# Patient Record
Sex: FEMALE | Race: WHITE | Employment: FULL TIME | URBAN - METROPOLITAN AREA
[De-identification: names, ages, dates, MRNs, and addresses within clinical notes are randomized per-mention and may not be internally consistent; named-entity substitution may affect disease eponyms.]

---

## 2019-06-25 ENCOUNTER — OFFICE VISIT (OUTPATIENT)
Dept: ENT CLINIC | Age: 39
End: 2019-06-25
Payer: OTHER GOVERNMENT

## 2019-06-25 ENCOUNTER — PROCEDURE VISIT (OUTPATIENT)
Dept: AUDIOLOGY | Age: 39
End: 2019-06-25
Payer: OTHER GOVERNMENT

## 2019-06-25 VITALS
HEART RATE: 72 BPM | WEIGHT: 198.6 LBS | SYSTOLIC BLOOD PRESSURE: 119 MMHG | HEIGHT: 63 IN | DIASTOLIC BLOOD PRESSURE: 87 MMHG | BODY MASS INDEX: 35.19 KG/M2

## 2019-06-25 DIAGNOSIS — H69.83 ETD (EUSTACHIAN TUBE DYSFUNCTION), BILATERAL: ICD-10-CM

## 2019-06-25 DIAGNOSIS — H90.12 CONDCTV HEAR LOSS, UNI, LEFT EAR, W UNRESTR HEAR CNTRA SIDE: Primary | ICD-10-CM

## 2019-06-25 DIAGNOSIS — H91.93 BILATERAL HEARING LOSS, UNSPECIFIED HEARING LOSS TYPE: Primary | ICD-10-CM

## 2019-06-25 PROCEDURE — 92557 COMPREHENSIVE HEARING TEST: CPT | Performed by: AUDIOLOGIST

## 2019-06-25 PROCEDURE — 92550 TYMPANOMETRY & REFLEX THRESH: CPT | Performed by: AUDIOLOGIST

## 2019-06-25 PROCEDURE — 99203 OFFICE O/P NEW LOW 30 MIN: CPT | Performed by: OTOLARYNGOLOGY

## 2019-06-25 RX ORDER — FLUTICASONE PROPIONATE 50 MCG
SPRAY, SUSPENSION (ML) NASAL
Qty: 1 BOTTLE | Refills: 2 | Status: SHIPPED | OUTPATIENT
Start: 2019-06-25

## 2019-06-25 NOTE — PROGRESS NOTES
Subjective:      Patient ID: Dina Blanca is a 35 y.o. female. who complains of decrease hearing left ear for 6 month duration. Objective:       Assessment:      Mild essentially conductive hearing loss left ear. Right ear responses were within normal limits. Stiff tympanogram left ear.       Plan:      Medical follow-up  Hearing retest as needed  Localization issues discussed    See scan audiogram and tympanogram for additional detail        Link Stroud, AuD

## 2019-07-10 ENCOUNTER — TELEPHONE (OUTPATIENT)
Dept: ENT CLINIC | Age: 39
End: 2019-07-10

## 2019-07-31 ENCOUNTER — OFFICE VISIT (OUTPATIENT)
Dept: ENT CLINIC | Age: 39
End: 2019-07-31
Payer: OTHER GOVERNMENT

## 2019-07-31 VITALS
SYSTOLIC BLOOD PRESSURE: 115 MMHG | BODY MASS INDEX: 34.74 KG/M2 | HEART RATE: 74 BPM | DIASTOLIC BLOOD PRESSURE: 77 MMHG | WEIGHT: 196.1 LBS

## 2019-07-31 DIAGNOSIS — H69.83 ETD (EUSTACHIAN TUBE DYSFUNCTION), BILATERAL: Primary | ICD-10-CM

## 2019-07-31 DIAGNOSIS — H74.8X2: ICD-10-CM

## 2019-07-31 DIAGNOSIS — H92.02 OTALGIA OF LEFT EAR: ICD-10-CM

## 2019-07-31 DIAGNOSIS — H90.12 CONDUCTIVE HEARING LOSS OF LEFT EAR WITH UNRESTRICTED HEARING OF RIGHT EAR: ICD-10-CM

## 2019-07-31 PROBLEM — H90.A12 CONDUCTIVE HEARING LOSS OF LEFT EAR WITH RESTRICTED HEARING OF RIGHT EAR: Status: ACTIVE | Noted: 2019-07-31

## 2019-07-31 PROBLEM — H69.93 ETD (EUSTACHIAN TUBE DYSFUNCTION), BILATERAL: Status: ACTIVE | Noted: 2019-07-31

## 2019-07-31 PROCEDURE — 99214 OFFICE O/P EST MOD 30 MIN: CPT | Performed by: OTOLARYNGOLOGY

## 2019-07-31 NOTE — PROGRESS NOTES
that her previous ENT physician tried to put the tube in the office under local anesthesia and she did not tolerate it. He was not able to insert the tube in the office. The procedure was then done in the operating room under general anesthesia. She stated that she is reluctant to have this done in the office under local anesthesia due to this experience as well as the retraction of the tympanic membrane. She prefers to have this done under general anesthesia in the operating room. Alonso Kitchen / Blade Elliott / Dariusz Newton / PROCEDURES:       Eliana Nickerson was seen today for ear problem. Diagnoses and all orders for this visit:    ETD (Eustachian tube dysfunction), bilateral    Middle ear atelectasis, left    Conductive hearing loss of left ear with unrestricted hearing of right ear    Otalgia of left ear         RECOMMENDATIONS / PLAN:    1. Left myringtomy and insertion of PE tube, examination and possible biopsy of nasopharynx. 2. Return for post op check. TIME:  I spent a total of 25 minutes with this patient; counseling time = 18 minutes. More than 50% of the visit was spent counseling, coordination of care, and/or reviewing the medical record and radiology reports.

## 2019-08-09 NOTE — PROGRESS NOTES
to fill your prescriptions. 24. If you use oxygen and have a portable tank please bring it  with you the DOS             25. Bring a complete list of all your medications with name and dose include any supplements. 26. Other__________________________________________   *Please call pre admission testing if you any further questions   Keith Kwan 40    [facility] [place]. [facility]  [phone]   [address] [place] [address]       All above information reviewed with patient in person or by phone. Patient verbalizes understanding. All questions and concerns addressed.                                                                                                  Patient/Rep_________pt___________                                                                                                                                    PRE OP INSTRUCTIONS

## 2019-08-15 ENCOUNTER — HOSPITAL ENCOUNTER (OUTPATIENT)
Age: 39
Setting detail: OUTPATIENT SURGERY
Discharge: HOME | End: 2019-08-15
Attending: OTOLARYNGOLOGY | Admitting: OTOLARYNGOLOGY
Payer: OTHER GOVERNMENT

## 2019-08-15 ENCOUNTER — ANESTHESIA (OUTPATIENT)
Dept: OPERATING ROOM | Age: 39
End: 2019-08-15
Payer: OTHER GOVERNMENT

## 2019-08-15 ENCOUNTER — ANESTHESIA EVENT (OUTPATIENT)
Dept: OPERATING ROOM | Age: 39
End: 2019-08-15
Payer: OTHER GOVERNMENT

## 2019-08-15 VITALS
OXYGEN SATURATION: 99 % | RESPIRATION RATE: 15 BRPM | TEMPERATURE: 97.7 F | SYSTOLIC BLOOD PRESSURE: 120 MMHG | DIASTOLIC BLOOD PRESSURE: 59 MMHG

## 2019-08-15 VITALS
WEIGHT: 198 LBS | TEMPERATURE: 97.3 F | OXYGEN SATURATION: 95 % | BODY MASS INDEX: 35.08 KG/M2 | RESPIRATION RATE: 16 BRPM | HEART RATE: 79 BPM | DIASTOLIC BLOOD PRESSURE: 79 MMHG | HEIGHT: 63 IN | SYSTOLIC BLOOD PRESSURE: 115 MMHG

## 2019-08-15 DIAGNOSIS — H92.02 OTALGIA OF LEFT EAR: ICD-10-CM

## 2019-08-15 DIAGNOSIS — H69.83 ETD (EUSTACHIAN TUBE DYSFUNCTION), BILATERAL: Primary | ICD-10-CM

## 2019-08-15 DIAGNOSIS — G89.18 POSTOPERATIVE PAIN: ICD-10-CM

## 2019-08-15 DIAGNOSIS — H90.A12 CONDUCTIVE HEARING LOSS OF LEFT EAR WITH RESTRICTED HEARING OF RIGHT EAR: ICD-10-CM

## 2019-08-15 DIAGNOSIS — H74.8X2: ICD-10-CM

## 2019-08-15 LAB — HCG(URINE) PREGNANCY TEST: NEGATIVE

## 2019-08-15 PROCEDURE — 6360000002 HC RX W HCPCS: Performed by: NURSE ANESTHETIST, CERTIFIED REGISTERED

## 2019-08-15 PROCEDURE — 84703 CHORIONIC GONADOTROPIN ASSAY: CPT

## 2019-08-15 PROCEDURE — 3600000003 HC SURGERY LEVEL 3 BASE: Performed by: OTOLARYNGOLOGY

## 2019-08-15 PROCEDURE — 6370000000 HC RX 637 (ALT 250 FOR IP): Performed by: OTOLARYNGOLOGY

## 2019-08-15 PROCEDURE — 2780000010 HC IMPLANT OTHER: Performed by: OTOLARYNGOLOGY

## 2019-08-15 PROCEDURE — 42804 BIOPSY OF UPPER NOSE/THROAT: CPT | Performed by: OTOLARYNGOLOGY

## 2019-08-15 PROCEDURE — 3700000000 HC ANESTHESIA ATTENDED CARE: Performed by: OTOLARYNGOLOGY

## 2019-08-15 PROCEDURE — 2709999900 HC NON-CHARGEABLE SUPPLY: Performed by: OTOLARYNGOLOGY

## 2019-08-15 PROCEDURE — 7100000001 HC PACU RECOVERY - ADDTL 15 MIN: Performed by: OTOLARYNGOLOGY

## 2019-08-15 PROCEDURE — 69436 CREATE EARDRUM OPENING: CPT | Performed by: OTOLARYNGOLOGY

## 2019-08-15 PROCEDURE — 2580000003 HC RX 258: Performed by: OTOLARYNGOLOGY

## 2019-08-15 PROCEDURE — 3600000013 HC SURGERY LEVEL 3 ADDTL 15MIN: Performed by: OTOLARYNGOLOGY

## 2019-08-15 PROCEDURE — 7100000011 HC PHASE II RECOVERY - ADDTL 15 MIN: Performed by: OTOLARYNGOLOGY

## 2019-08-15 PROCEDURE — 7100000000 HC PACU RECOVERY - FIRST 15 MIN: Performed by: OTOLARYNGOLOGY

## 2019-08-15 PROCEDURE — 3700000001 HC ADD 15 MINUTES (ANESTHESIA): Performed by: OTOLARYNGOLOGY

## 2019-08-15 PROCEDURE — 7100000010 HC PHASE II RECOVERY - FIRST 15 MIN: Performed by: OTOLARYNGOLOGY

## 2019-08-15 PROCEDURE — 88304 TISSUE EXAM BY PATHOLOGIST: CPT

## 2019-08-15 PROCEDURE — 2500000003 HC RX 250 WO HCPCS: Performed by: NURSE ANESTHETIST, CERTIFIED REGISTERED

## 2019-08-15 DEVICE — IMPLANTABLE DEVICE: Type: IMPLANTABLE DEVICE | Site: EAR | Status: FUNCTIONAL

## 2019-08-15 RX ORDER — PROMETHAZINE HYDROCHLORIDE 25 MG/1
25 TABLET ORAL EVERY 6 HOURS PRN
Qty: 20 TABLET | Refills: 0 | Status: SHIPPED | OUTPATIENT
Start: 2019-08-15 | End: 2019-09-23

## 2019-08-15 RX ORDER — ONDANSETRON 2 MG/ML
INJECTION INTRAMUSCULAR; INTRAVENOUS PRN
Status: DISCONTINUED | OUTPATIENT
Start: 2019-08-15 | End: 2019-08-15 | Stop reason: SDUPTHER

## 2019-08-15 RX ORDER — DIPHENHYDRAMINE HYDROCHLORIDE 50 MG/ML
12.5 INJECTION INTRAMUSCULAR; INTRAVENOUS
Status: DISCONTINUED | OUTPATIENT
Start: 2019-08-15 | End: 2019-08-15 | Stop reason: HOSPADM

## 2019-08-15 RX ORDER — MIDAZOLAM HYDROCHLORIDE 1 MG/ML
INJECTION INTRAMUSCULAR; INTRAVENOUS PRN
Status: DISCONTINUED | OUTPATIENT
Start: 2019-08-15 | End: 2019-08-15 | Stop reason: SDUPTHER

## 2019-08-15 RX ORDER — FENTANYL CITRATE 50 UG/ML
50 INJECTION, SOLUTION INTRAMUSCULAR; INTRAVENOUS EVERY 5 MIN PRN
Status: DISCONTINUED | OUTPATIENT
Start: 2019-08-15 | End: 2019-08-15 | Stop reason: HOSPADM

## 2019-08-15 RX ORDER — HYDROMORPHONE HCL 110MG/55ML
0.25 PATIENT CONTROLLED ANALGESIA SYRINGE INTRAVENOUS EVERY 5 MIN PRN
Status: DISCONTINUED | OUTPATIENT
Start: 2019-08-15 | End: 2019-08-15 | Stop reason: HOSPADM

## 2019-08-15 RX ORDER — SODIUM CHLORIDE 9 MG/ML
INJECTION, SOLUTION INTRAVENOUS CONTINUOUS
Status: DISCONTINUED | OUTPATIENT
Start: 2019-08-15 | End: 2019-08-15 | Stop reason: HOSPADM

## 2019-08-15 RX ORDER — GLYCOPYRROLATE 0.2 MG/ML
INJECTION INTRAMUSCULAR; INTRAVENOUS PRN
Status: DISCONTINUED | OUTPATIENT
Start: 2019-08-15 | End: 2019-08-15 | Stop reason: SDUPTHER

## 2019-08-15 RX ORDER — PROPOFOL 10 MG/ML
INJECTION, EMULSION INTRAVENOUS PRN
Status: DISCONTINUED | OUTPATIENT
Start: 2019-08-15 | End: 2019-08-15 | Stop reason: SDUPTHER

## 2019-08-15 RX ORDER — ROCURONIUM BROMIDE 10 MG/ML
INJECTION, SOLUTION INTRAVENOUS PRN
Status: DISCONTINUED | OUTPATIENT
Start: 2019-08-15 | End: 2019-08-15 | Stop reason: SDUPTHER

## 2019-08-15 RX ORDER — LIDOCAINE HYDROCHLORIDE 10 MG/ML
0.5 INJECTION, SOLUTION EPIDURAL; INFILTRATION; INTRACAUDAL; PERINEURAL ONCE
Status: DISCONTINUED | OUTPATIENT
Start: 2019-08-15 | End: 2019-08-15 | Stop reason: HOSPADM

## 2019-08-15 RX ORDER — MEPERIDINE HYDROCHLORIDE 25 MG/ML
12.5 INJECTION INTRAMUSCULAR; INTRAVENOUS; SUBCUTANEOUS EVERY 5 MIN PRN
Status: DISCONTINUED | OUTPATIENT
Start: 2019-08-15 | End: 2019-08-15 | Stop reason: HOSPADM

## 2019-08-15 RX ORDER — OXYCODONE HYDROCHLORIDE 5 MG/1
10 TABLET ORAL PRN
Status: DISCONTINUED | OUTPATIENT
Start: 2019-08-15 | End: 2019-08-15 | Stop reason: HOSPADM

## 2019-08-15 RX ORDER — SUCCINYLCHOLINE/SOD CL,ISO/PF 100 MG/5ML
SYRINGE (ML) INTRAVENOUS PRN
Status: DISCONTINUED | OUTPATIENT
Start: 2019-08-15 | End: 2019-08-15 | Stop reason: SDUPTHER

## 2019-08-15 RX ORDER — SODIUM CHLORIDE 0.9 % (FLUSH) 0.9 %
10 SYRINGE (ML) INJECTION PRN
Status: DISCONTINUED | OUTPATIENT
Start: 2019-08-15 | End: 2019-08-15 | Stop reason: HOSPADM

## 2019-08-15 RX ORDER — SODIUM CHLORIDE, SODIUM LACTATE, POTASSIUM CHLORIDE, CALCIUM CHLORIDE 600; 310; 30; 20 MG/100ML; MG/100ML; MG/100ML; MG/100ML
INJECTION, SOLUTION INTRAVENOUS CONTINUOUS
Status: DISCONTINUED | OUTPATIENT
Start: 2019-08-15 | End: 2019-08-15 | Stop reason: HOSPADM

## 2019-08-15 RX ORDER — LABETALOL HYDROCHLORIDE 5 MG/ML
5 INJECTION, SOLUTION INTRAVENOUS EVERY 10 MIN PRN
Status: DISCONTINUED | OUTPATIENT
Start: 2019-08-15 | End: 2019-08-15 | Stop reason: HOSPADM

## 2019-08-15 RX ORDER — SULFACETAMIDE SODIUM AND PREDNISOLONE SODIUM PHOSPHATE 100; 2.3 MG/ML; MG/ML
SOLUTION/ DROPS OPHTHALMIC
Status: COMPLETED | OUTPATIENT
Start: 2019-08-15 | End: 2019-08-15

## 2019-08-15 RX ORDER — PHENYLEPHRINE HCL IN 0.9% NACL 1 MG/10 ML
SYRINGE (ML) INTRAVENOUS PRN
Status: DISCONTINUED | OUTPATIENT
Start: 2019-08-15 | End: 2019-08-15 | Stop reason: SDUPTHER

## 2019-08-15 RX ORDER — HYDROCODONE BITARTRATE AND ACETAMINOPHEN 5; 325 MG/1; MG/1
1 TABLET ORAL EVERY 4 HOURS PRN
Qty: 30 TABLET | Refills: 0 | Status: SHIPPED | OUTPATIENT
Start: 2019-08-15 | End: 2019-08-20

## 2019-08-15 RX ORDER — FENTANYL CITRATE 50 UG/ML
INJECTION, SOLUTION INTRAMUSCULAR; INTRAVENOUS PRN
Status: DISCONTINUED | OUTPATIENT
Start: 2019-08-15 | End: 2019-08-15 | Stop reason: SDUPTHER

## 2019-08-15 RX ORDER — NEOSTIGMINE METHYLSULFATE 5 MG/5 ML
SYRINGE (ML) INTRAVENOUS PRN
Status: DISCONTINUED | OUTPATIENT
Start: 2019-08-15 | End: 2019-08-15 | Stop reason: SDUPTHER

## 2019-08-15 RX ORDER — SODIUM CHLORIDE 0.9 % (FLUSH) 0.9 %
10 SYRINGE (ML) INJECTION EVERY 12 HOURS SCHEDULED
Status: DISCONTINUED | OUTPATIENT
Start: 2019-08-15 | End: 2019-08-15 | Stop reason: HOSPADM

## 2019-08-15 RX ORDER — LIDOCAINE HYDROCHLORIDE 20 MG/ML
INJECTION, SOLUTION INFILTRATION; PERINEURAL PRN
Status: DISCONTINUED | OUTPATIENT
Start: 2019-08-15 | End: 2019-08-15 | Stop reason: SDUPTHER

## 2019-08-15 RX ORDER — PROMETHAZINE HYDROCHLORIDE 25 MG/ML
6.25 INJECTION, SOLUTION INTRAMUSCULAR; INTRAVENOUS PRN
Status: DISCONTINUED | OUTPATIENT
Start: 2019-08-15 | End: 2019-08-15 | Stop reason: HOSPADM

## 2019-08-15 RX ORDER — LIDOCAINE HYDROCHLORIDE 10 MG/ML
INJECTION, SOLUTION EPIDURAL; INFILTRATION; INTRACAUDAL; PERINEURAL
Status: DISCONTINUED
Start: 2019-08-15 | End: 2019-08-15 | Stop reason: HOSPADM

## 2019-08-15 RX ORDER — DEXAMETHASONE SODIUM PHOSPHATE 4 MG/ML
INJECTION, SOLUTION INTRA-ARTICULAR; INTRALESIONAL; INTRAMUSCULAR; INTRAVENOUS; SOFT TISSUE PRN
Status: DISCONTINUED | OUTPATIENT
Start: 2019-08-15 | End: 2019-08-15 | Stop reason: SDUPTHER

## 2019-08-15 RX ORDER — HYDROMORPHONE HCL 110MG/55ML
0.5 PATIENT CONTROLLED ANALGESIA SYRINGE INTRAVENOUS EVERY 5 MIN PRN
Status: DISCONTINUED | OUTPATIENT
Start: 2019-08-15 | End: 2019-08-15 | Stop reason: HOSPADM

## 2019-08-15 RX ORDER — ESMOLOL HYDROCHLORIDE 10 MG/ML
INJECTION INTRAVENOUS PRN
Status: DISCONTINUED | OUTPATIENT
Start: 2019-08-15 | End: 2019-08-15 | Stop reason: SDUPTHER

## 2019-08-15 RX ORDER — OXYCODONE HYDROCHLORIDE 5 MG/1
5 TABLET ORAL PRN
Status: DISCONTINUED | OUTPATIENT
Start: 2019-08-15 | End: 2019-08-15 | Stop reason: HOSPADM

## 2019-08-15 RX ADMIN — GLYCOPYRROLATE 0.2 MG: 0.2 INJECTION INTRAMUSCULAR; INTRAVENOUS at 09:08

## 2019-08-15 RX ADMIN — PROPOFOL 150 MG: 10 INJECTION, EMULSION INTRAVENOUS at 08:59

## 2019-08-15 RX ADMIN — FENTANYL CITRATE 25 MCG: 50 INJECTION, SOLUTION INTRAMUSCULAR; INTRAVENOUS at 09:26

## 2019-08-15 RX ADMIN — FENTANYL CITRATE 50 MCG: 50 INJECTION, SOLUTION INTRAMUSCULAR; INTRAVENOUS at 08:59

## 2019-08-15 RX ADMIN — GLYCOPYRROLATE 0.2 MG: 0.2 INJECTION INTRAMUSCULAR; INTRAVENOUS at 09:37

## 2019-08-15 RX ADMIN — LIDOCAINE HYDROCHLORIDE 80 MG: 20 INJECTION, SOLUTION INFILTRATION; PERINEURAL at 08:59

## 2019-08-15 RX ADMIN — ESMOLOL HYDROCHLORIDE 10 MG: 10 INJECTION, SOLUTION INTRAVENOUS at 09:29

## 2019-08-15 RX ADMIN — Medication 100 MCG: at 09:12

## 2019-08-15 RX ADMIN — GLYCOPYRROLATE 0.2 MG: 0.2 INJECTION INTRAMUSCULAR; INTRAVENOUS at 09:12

## 2019-08-15 RX ADMIN — MIDAZOLAM HYDROCHLORIDE 1 MG: 1 INJECTION, SOLUTION INTRAMUSCULAR; INTRAVENOUS at 08:56

## 2019-08-15 RX ADMIN — PROPOFOL 20 MG: 10 INJECTION, EMULSION INTRAVENOUS at 09:21

## 2019-08-15 RX ADMIN — ONDANSETRON 4 MG: 2 INJECTION INTRAMUSCULAR; INTRAVENOUS at 09:05

## 2019-08-15 RX ADMIN — Medication 2 MG: at 09:37

## 2019-08-15 RX ADMIN — Medication 100 MG: at 08:59

## 2019-08-15 RX ADMIN — PROPOFOL 20 MG: 10 INJECTION, EMULSION INTRAVENOUS at 09:15

## 2019-08-15 RX ADMIN — FENTANYL CITRATE 25 MCG: 50 INJECTION, SOLUTION INTRAMUSCULAR; INTRAVENOUS at 09:35

## 2019-08-15 RX ADMIN — FENTANYL CITRATE 25 MCG: 50 INJECTION, SOLUTION INTRAMUSCULAR; INTRAVENOUS at 09:20

## 2019-08-15 RX ADMIN — FENTANYL CITRATE 25 MCG: 50 INJECTION, SOLUTION INTRAMUSCULAR; INTRAVENOUS at 09:17

## 2019-08-15 RX ADMIN — ROCURONIUM BROMIDE 10 MG: 10 INJECTION, SOLUTION INTRAVENOUS at 09:16

## 2019-08-15 RX ADMIN — SODIUM CHLORIDE, POTASSIUM CHLORIDE, SODIUM LACTATE AND CALCIUM CHLORIDE: 600; 310; 30; 20 INJECTION, SOLUTION INTRAVENOUS at 08:49

## 2019-08-15 RX ADMIN — ROCURONIUM BROMIDE 10 MG: 10 INJECTION, SOLUTION INTRAVENOUS at 08:59

## 2019-08-15 RX ADMIN — DEXAMETHASONE SODIUM PHOSPHATE 8 MG: 4 INJECTION, SOLUTION INTRAMUSCULAR; INTRAVENOUS at 09:05

## 2019-08-15 ASSESSMENT — PULMONARY FUNCTION TESTS
PIF_VALUE: 13
PIF_VALUE: 15
PIF_VALUE: 11
PIF_VALUE: 15
PIF_VALUE: 14
PIF_VALUE: 8
PIF_VALUE: 11
PIF_VALUE: 2
PIF_VALUE: 0
PIF_VALUE: 1
PIF_VALUE: 20
PIF_VALUE: 14
PIF_VALUE: 14
PIF_VALUE: 20
PIF_VALUE: 21
PIF_VALUE: 2
PIF_VALUE: 21
PIF_VALUE: 14
PIF_VALUE: 2
PIF_VALUE: 18
PIF_VALUE: 13
PIF_VALUE: 2
PIF_VALUE: 19
PIF_VALUE: 22
PIF_VALUE: 16
PIF_VALUE: 15
PIF_VALUE: 20
PIF_VALUE: 17
PIF_VALUE: 14
PIF_VALUE: 18
PIF_VALUE: 20
PIF_VALUE: 14
PIF_VALUE: 17
PIF_VALUE: 21
PIF_VALUE: 3
PIF_VALUE: 17
PIF_VALUE: 15
PIF_VALUE: 14
PIF_VALUE: 15
PIF_VALUE: 15
PIF_VALUE: 23
PIF_VALUE: 2
PIF_VALUE: 0
PIF_VALUE: 24
PIF_VALUE: 14

## 2019-08-15 NOTE — H&P
I have reviewed the history and physical and examined the patient and find no relevant changes. I have reviewed with the patient and/or family the risks, benefits, and alternatives to the procedure.     Elizabeth Spann MD  8/15/2019

## 2019-08-16 ENCOUNTER — TELEPHONE (OUTPATIENT)
Dept: ENT CLINIC | Age: 39
End: 2019-08-16

## 2019-08-16 NOTE — TELEPHONE ENCOUNTER
Pt called stating she had a tube put in and was wondering why the Byopsy had to be done. The pt was unsure.  Pleas review

## 2019-08-20 ENCOUNTER — TELEPHONE (OUTPATIENT)
Dept: ENT CLINIC | Age: 39
End: 2019-08-20

## 2019-09-23 ENCOUNTER — OFFICE VISIT (OUTPATIENT)
Dept: ENT CLINIC | Age: 39
End: 2019-09-23
Payer: OTHER GOVERNMENT

## 2019-09-23 VITALS
DIASTOLIC BLOOD PRESSURE: 77 MMHG | SYSTOLIC BLOOD PRESSURE: 117 MMHG | BODY MASS INDEX: 34.41 KG/M2 | HEIGHT: 63 IN | WEIGHT: 194.2 LBS | HEART RATE: 74 BPM

## 2019-09-23 DIAGNOSIS — Z96.22 PATENT PRESSURE EQUALIZATION (PE) TUBE: ICD-10-CM

## 2019-09-23 DIAGNOSIS — H69.83 ETD (EUSTACHIAN TUBE DYSFUNCTION), BILATERAL: Primary | Chronic | ICD-10-CM

## 2019-09-23 PROCEDURE — 99212 OFFICE O/P EST SF 10 MIN: CPT | Performed by: OTOLARYNGOLOGY

## 2019-10-03 ENCOUNTER — PROCEDURE VISIT (OUTPATIENT)
Dept: AUDIOLOGY | Age: 39
End: 2019-10-03
Payer: OTHER GOVERNMENT

## 2019-10-03 DIAGNOSIS — H90.6 MIXED CONDUCTIVE AND SENSORINEURAL HEARING LOSS, BILATERAL: Primary | ICD-10-CM

## 2019-10-03 PROCEDURE — 92550 TYMPANOMETRY & REFLEX THRESH: CPT | Performed by: AUDIOLOGIST

## 2019-10-03 PROCEDURE — 92557 COMPREHENSIVE HEARING TEST: CPT | Performed by: AUDIOLOGIST

## 2019-10-18 LAB
HPV COMMENT: NORMAL
HPV TYPE 16: NOT DETECTED
HPV TYPE 18: NOT DETECTED
HPVOH (OTHER TYPES): NOT DETECTED

## 2019-11-08 ENCOUNTER — OFFICE VISIT (OUTPATIENT)
Dept: ENT CLINIC | Age: 39
End: 2019-11-08
Payer: OTHER GOVERNMENT

## 2019-11-08 VITALS
HEART RATE: 71 BPM | BODY MASS INDEX: 34.4 KG/M2 | DIASTOLIC BLOOD PRESSURE: 76 MMHG | HEIGHT: 63 IN | SYSTOLIC BLOOD PRESSURE: 115 MMHG

## 2019-11-08 DIAGNOSIS — Z96.22 PATENT PRESSURE EQUALIZATION (PE) TUBE: ICD-10-CM

## 2019-11-08 DIAGNOSIS — H90.12 CONDUCTIVE HEARING LOSS OF LEFT EAR WITH UNRESTRICTED HEARING OF RIGHT EAR: ICD-10-CM

## 2019-11-08 DIAGNOSIS — H69.83 ETD (EUSTACHIAN TUBE DYSFUNCTION), BILATERAL: Primary | ICD-10-CM

## 2019-11-08 DIAGNOSIS — H74.8X2: ICD-10-CM

## 2019-11-08 PROCEDURE — 99214 OFFICE O/P EST MOD 30 MIN: CPT | Performed by: OTOLARYNGOLOGY

## 2019-11-15 ENCOUNTER — HOSPITAL ENCOUNTER (OUTPATIENT)
Dept: MAMMOGRAPHY | Age: 39
Discharge: HOME | End: 2019-11-15
Payer: OTHER GOVERNMENT

## 2019-11-15 DIAGNOSIS — Z12.31 BREAST CANCER SCREENING BY MAMMOGRAM: ICD-10-CM

## 2019-11-15 PROCEDURE — 77067 SCR MAMMO BI INCL CAD: CPT

## 2020-04-23 NOTE — TELEPHONE ENCOUNTER
Fluticasone was last prescribed 6/25/2019, almost a year ago. Her last office visit was 11/8/2019. She is due for a follow-up visit, which must be done prior to a new prescription or refill. Since we are unable to see patients in office this must be done as a video virtual visit or she can wait until the office reopens, hopefully by late May or early June. Fluticasone is available over-the-counter.

## 2020-04-27 RX ORDER — FLUTICASONE PROPIONATE 50 MCG
SPRAY, SUSPENSION (ML) NASAL
Qty: 16 G | Refills: 0 | OUTPATIENT
Start: 2020-04-27

## 2020-04-27 NOTE — TELEPHONE ENCOUNTER
Noted, but in the post COVID-19 world, visits for refills will be done by virtual visits, unless a physical examination is required.

## 2020-09-09 ENCOUNTER — OFFICE VISIT (OUTPATIENT)
Dept: ENT CLINIC | Age: 40
End: 2020-09-09
Payer: OTHER GOVERNMENT

## 2020-09-09 ENCOUNTER — TELEPHONE (OUTPATIENT)
Dept: ENT CLINIC | Age: 40
End: 2020-09-09

## 2020-09-09 VITALS
BODY MASS INDEX: 32.25 KG/M2 | DIASTOLIC BLOOD PRESSURE: 67 MMHG | HEART RATE: 75 BPM | SYSTOLIC BLOOD PRESSURE: 113 MMHG | TEMPERATURE: 97.9 F | HEIGHT: 63 IN | WEIGHT: 182 LBS

## 2020-09-09 PROCEDURE — 99214 OFFICE O/P EST MOD 30 MIN: CPT | Performed by: OTOLARYNGOLOGY

## 2020-09-09 NOTE — TELEPHONE ENCOUNTER
Question re surgery, Ct and audio were mentioned during office visit. Do these need to be completed prior to surgery?   Please advise

## 2020-09-09 NOTE — PROGRESS NOTES
[facility]         PCP:  Rosanne Lisa MD      CHIEF COMPLAINT:  Chief Complaint   Patient presents with    Hearing Loss     trouble hearing at times.  Otalgia     bilateral ear pain off and on. HISTORY OF PRESENT ILLNESS:   Contreras Kulkarni is a 38 y.o. female here for recheck and follow-up of a problem located in the ears. Since the last visit here, hearing is decreased in the left ear. Has occasional symptoms on the right ear. Able to autoinflate the right ear, but not the left. Has been using Flonase and Allegra since summer last year. REVIEW OF SYSTEMS:   EARS, NOSE, THROAT:  Denied otorrhea, otalgia, tinnitus, nasal dyspnea, rhinorrhea, sore throat and chronic hoarseness. EXAMINATION:      Vitals:    09/09/20 1514   BP: 113/67   Site: Left Upper Arm   Position: Sitting   Cuff Size: Medium Adult   Pulse: 75   Temp: 97.9 °F (36.6 °C)   TempSrc: Oral   Weight: 182 lb (82.6 kg)   Height: 5' 3\" (1.6 m)      VITALS SIGNS were reviewed. GENERAL APPEARANCE:  Well developed, well nourished, no apparent distress, no apparent deformities. ABILITY TO COMMUNICATE/QUALITY OF VOICE:  Communicated without difficulty. Normal voice. No hot potato voice. No hoarseness. EYES:  Extraocular motion was intact, bilaterally. Normal primary gaze alignment. Sclera and conjunctiva clear bilaterally. SALIVARY GLANDS:  The parotid, submandibular, and sublingual glands were normal bilaterally. EXTERNAL EAR/NOSE:  Normal pinnae and mastoids. Normal external nose. (+) EARS, OTOMICROSCOPY:  The PE tube was out of the left TM. The left TM was dull, thick, with moderately severe negative ME pressure, and moderately retracted onto the promontory and I-S joint. It did elevate completely with autoinflation, but then retracted again right away. Old tube was lying in the outer EAC and was removed with removal of wax.   The right TM was mildly retracted, dull, with mild neg ME pressure. The left TM was normally mobile to pneumatic massage after autoinflation and before recurrence of retraction. (+) NOSE:  The nasal septum was moderately deviated to the left. The inferior turbinates were normal.  The nasal mucosa and secretions were normal.  No pus or polyps were seen. SINUSES:  The maxillary and frontal sinuses were nontender, bilaterally. LIPS, TEETH, AND GUMS:   Normal.     OROPHARYNX/ORAL CAVITY:  Oral mucosa, hard and soft palates, tongue, and pharynx were normal.      NECK:  No masses or tenderness. The laryngeal cartilages and hyoid bone were normal, with normal laryngeal crepitus. No abnormal appearance, asymmetry or abnormal tracheal position. PALPATION OF LYMPH NODES, CERVICAL, FACIAL AND SUPRACLAVICULAR:  No lymphadenopathy. COUNSELING:  Yann Meza was counseled for the procedure of left myringotomy and insertion of tympanostomy tube (PE tube) and bilateral balloon dilation of the eustachian tubes. Yann Meza stated that her desire to proceed with the surgery. The surgical procedure was described. I discussed the tympanic membrane incision. I advised of the possibility of permanent hole in the ear drum after the tube extrudes or is removed in the future. I advised of the possibility of persistent or increased hearing loss. I discussed persistent disease, middle ear effusion, and recurrent ear infections. I advised of 20% incidence of otorrhea and/or ear infection while tubes are in placed. I advised of need for strict water precautions. I discussed early extrusion of the tubes. I discussed scarring and/or thinning of the ear drum. I advised that eustachian tube dysfunction may persist despite balloon dilation. I advised of potential complication of anesthesia, including but not limited to cardiac arrest, heart attack, stroke, adverse reaction to medications, and death.   I discussed the surgical technique

## 2020-09-11 NOTE — TELEPHONE ENCOUNTER
Patient calling  And saying that she has surgery scheduled for next Friday with Dr Patricia Miguel,  And she needed to have a ct scan before that,  Please advise

## 2020-09-11 NOTE — TELEPHONE ENCOUNTER
[phone] (home)   Call back to PT, surgery has been sent to surgery scheduling.   anton f/u re CT

## 2020-09-15 ENCOUNTER — PROCEDURE VISIT (OUTPATIENT)
Dept: AUDIOLOGY | Age: 40
End: 2020-09-15
Payer: OTHER GOVERNMENT

## 2020-09-15 ENCOUNTER — OFFICE VISIT (OUTPATIENT)
Dept: PRIMARY CARE CLINIC | Age: 40
End: 2020-09-15
Payer: OTHER GOVERNMENT

## 2020-09-15 PROCEDURE — 92557 COMPREHENSIVE HEARING TEST: CPT | Performed by: AUDIOLOGIST

## 2020-09-15 PROCEDURE — 99211 OFF/OP EST MAY X REQ PHY/QHP: CPT | Performed by: NURSE PRACTITIONER

## 2020-09-15 NOTE — PATIENT INSTRUCTIONS

## 2020-09-15 NOTE — PROGRESS NOTES
Name_______________________________________Printed:____________________  Date and time of surgery___9/18/2020  0730_____________________Arrival Time:__0600  [facility] [place]__   1. The instructions given regarding when and if a patient needs to stop oral intake prior to surgery varies. Follow the specific instructions you were given                  XXX___Nothing to eat or to drink after Midnight the night before.                             ____Endoscopy patient follow your DRS instructions-generally you will be doing a part of the prep after Midnight                   ____Carbo loading or ERAS instructions will be given to select patients-if you have been given those instructions -please do the following                           The evening before your surgery after dinner before midnight drink 40 ounces of gatorade. If you are diabetic use sugar free. The morning of surgery drink 40 ounces of water. This needs to be finished 3 hours prior to your surgery start time. 2. Take the following pills with a small sip of water on the morning of surgery_________none__________________________________________                  Do not take blood pressure medications ending in pril or sartan the mariposa prior to surgery or the morning of surgery_   3. Aspirin, Ibuprofen, Advil, Naproxen, Vitamin E and other Anti-inflammatory products and supplements should be stopped for 5 -7days before surgery or as directed by your physician. 4. Check with your Doctor regarding stopping Plavix, Coumadin,Eliquis, Lovenox,Effient,Pradaxa,Xarelto, Fragmin or other blood thinners and follow their instructions. 5. Do not smoke, and do not drink any alcoholic beverages 24 hours prior to surgery. This includes NA Beer. Refrain from the usage of any recreational drugs. 6. You may brush your teeth and gargle the morning of surgery. DO NOT SWALLOW WATER   7.  You MUST make arrangements for a responsible adult to stay on site while you are here and take you home after your surgery. You will not be allowed to leave alone or drive yourself home. It is strongly suggested someone stay with you the first 24 hrs. Your surgery will be cancelled if you do not have a ride home. 8. A parent/legal guardian must accompany a child scheduled for surgery and plan to stay at the hospital until the child is discharged. Please do not bring other children with you. 9. Please wear simple, loose fitting clothing to the hospital.  Ambika Mortensen not bring valuables (money, credit cards, checkbooks, etc.) Do not wear any makeup (including no eye makeup) or nail polish on your fingers or toes. 10. DO NOT wear any jewelry or piercings on day of surgery. All body piercing jewelry must be removed. 11. If you have ___dentures, they will be removed before going to the OR; we will provide you a container. If you wear ___contact lenses or ___glasses, they will be removed; please bring a case for them. 12. Please see your family doctor/pediatrician for a history & physical and/or concerning medications. Bring any test results/reports from your physician's office. PCP__________________Phone___________H&P Appt. Date________             13 If you  have a Living Will and Durable Power of  for Healthcare, please bring in a copy. 15. Notify your Surgeon if you develop any illness between now and surgery  time, cough, cold, fever, sore throat, nausea, vomiting, etc.  Please notify your surgeon if you experience dizziness, shortness of breath or blurred vision between now & the time of your surgery             15. DO NOT shave your operative site 96 hours prior to surgery. For face & neck surgery, men may use an electric razor 48 hours prior to surgery. 16. Shower the night before or morning of surgery using an antibacterial soap or as you have been instructed.              17. To provide excellent care visitors will be limited to one ride is expected to remain in the car with a cell phone for communication. If the ride is leaving the hospital grounds please make sure they are back in time for pickup. Have the patient inform the staff on arrival what their rides plans are while the patient is in the facility. At the MAIN there is one visitor allowed. Please note that the visitor policy is subject to change.

## 2020-09-15 NOTE — PROGRESS NOTES
AUDIOLOGIC AND OTHER PERTINENT MEDICAL HISTORY:      Socorro Mckeon was seen for hearing and middle ear pressure evals. Otolarngology is referral source of todays appointment. Patient presents with some hearing concerns, that includes a \"muffled sensation\" of all sounds. Patient also reports difficulty hearing children. ASSESSMENT AND FINDINGS:     Audiogram demonstrates mild mixed loss from right ear. Left ear ranges from mild-moderate loss of sensory and conductive nature. Good speech understanding in quiet, at elevated levels demonstrated. Immittance: Unavailable during today's assessment     Chart cc'd to: Skyler Arambula MD      Degree of   Hearing Sensitivity dB Range   Within Normal Limits (WNL) 0 - 20   Mild 20 - 40   Moderate 40 - 55   Moderately-Severe 55 - 70   Severe 70 - 90   Profound 90 +       RECOMMENDATIONS:                                                                 1. Undergo PE tube insertion of left ear.

## 2020-09-15 NOTE — PROGRESS NOTES
Fide Arm received a viral test for COVID-19. They were educated on isolation and quarantine as appropriate. For any symptoms, they were directed to seek care from their PCP, given contact information to establish with a doctor, directed to an urgent care or the emergency room.

## 2020-09-16 LAB — SARS-COV-2, NAA: NOT DETECTED

## 2020-09-17 ENCOUNTER — ANESTHESIA EVENT (OUTPATIENT)
Dept: OPERATING ROOM | Age: 40
End: 2020-09-17
Payer: OTHER GOVERNMENT

## 2020-09-18 ENCOUNTER — TELEPHONE (OUTPATIENT)
Dept: ENT CLINIC | Age: 40
End: 2020-09-18

## 2020-09-18 ENCOUNTER — HOSPITAL ENCOUNTER (OUTPATIENT)
Age: 40
Setting detail: OUTPATIENT SURGERY
Discharge: HOME | End: 2020-09-18
Attending: OTOLARYNGOLOGY | Admitting: OTOLARYNGOLOGY
Payer: OTHER GOVERNMENT

## 2020-09-18 ENCOUNTER — ANESTHESIA (OUTPATIENT)
Dept: OPERATING ROOM | Age: 40
End: 2020-09-18
Payer: OTHER GOVERNMENT

## 2020-09-18 VITALS
RESPIRATION RATE: 17 BRPM | TEMPERATURE: 96.6 F | OXYGEN SATURATION: 100 % | DIASTOLIC BLOOD PRESSURE: 63 MMHG | SYSTOLIC BLOOD PRESSURE: 106 MMHG

## 2020-09-18 VITALS
TEMPERATURE: 97.3 F | HEART RATE: 64 BPM | DIASTOLIC BLOOD PRESSURE: 72 MMHG | OXYGEN SATURATION: 95 % | RESPIRATION RATE: 14 BRPM | WEIGHT: 181.25 LBS | BODY MASS INDEX: 32.11 KG/M2 | SYSTOLIC BLOOD PRESSURE: 113 MMHG | HEIGHT: 63 IN

## 2020-09-18 LAB — HCG(URINE) PREGNANCY TEST: NEGATIVE

## 2020-09-18 PROCEDURE — C1726 CATH, BAL DIL, NON-VASCULAR: HCPCS | Performed by: OTOLARYNGOLOGY

## 2020-09-18 PROCEDURE — 2580000003 HC RX 258: Performed by: ANESTHESIOLOGY

## 2020-09-18 PROCEDURE — 3600000002 HC SURGERY LEVEL 2 BASE: Performed by: OTOLARYNGOLOGY

## 2020-09-18 PROCEDURE — 3700000000 HC ANESTHESIA ATTENDED CARE: Performed by: OTOLARYNGOLOGY

## 2020-09-18 PROCEDURE — 7100000011 HC PHASE II RECOVERY - ADDTL 15 MIN: Performed by: OTOLARYNGOLOGY

## 2020-09-18 PROCEDURE — 7100000010 HC PHASE II RECOVERY - FIRST 15 MIN: Performed by: OTOLARYNGOLOGY

## 2020-09-18 PROCEDURE — 69436 CREATE EARDRUM OPENING: CPT | Performed by: OTOLARYNGOLOGY

## 2020-09-18 PROCEDURE — 6360000002 HC RX W HCPCS: Performed by: OTOLARYNGOLOGY

## 2020-09-18 PROCEDURE — 6360000002 HC RX W HCPCS: Performed by: NURSE ANESTHETIST, CERTIFIED REGISTERED

## 2020-09-18 PROCEDURE — 3700000001 HC ADD 15 MINUTES (ANESTHESIA): Performed by: OTOLARYNGOLOGY

## 2020-09-18 PROCEDURE — 2709999900 HC NON-CHARGEABLE SUPPLY: Performed by: OTOLARYNGOLOGY

## 2020-09-18 PROCEDURE — 2780000010 HC IMPLANT OTHER: Performed by: OTOLARYNGOLOGY

## 2020-09-18 PROCEDURE — 84703 CHORIONIC GONADOTROPIN ASSAY: CPT

## 2020-09-18 PROCEDURE — 6370000000 HC RX 637 (ALT 250 FOR IP): Performed by: OTOLARYNGOLOGY

## 2020-09-18 PROCEDURE — 69799 UNLISTED PX MIDDLE EAR: CPT | Performed by: OTOLARYNGOLOGY

## 2020-09-18 PROCEDURE — 2500000003 HC RX 250 WO HCPCS: Performed by: NURSE ANESTHETIST, CERTIFIED REGISTERED

## 2020-09-18 PROCEDURE — 3600000012 HC SURGERY LEVEL 2 ADDTL 15MIN: Performed by: OTOLARYNGOLOGY

## 2020-09-18 DEVICE — Z INVALID SUPPLIER IDENTIFIER ACTIVE NO SUB IDED TUBE VENT PAPARELLA 1.02 MM EAR TYP 2 INCUS STAP NOTCH SIL: Type: IMPLANTABLE DEVICE | Site: EAR | Status: FUNCTIONAL

## 2020-09-18 RX ORDER — SODIUM CHLORIDE 0.9 % (FLUSH) 0.9 %
10 SYRINGE (ML) INJECTION EVERY 12 HOURS SCHEDULED
Status: DISCONTINUED | OUTPATIENT
Start: 2020-09-18 | End: 2020-09-18 | Stop reason: HOSPADM

## 2020-09-18 RX ORDER — ONDANSETRON 2 MG/ML
4 INJECTION INTRAMUSCULAR; INTRAVENOUS
Status: DISCONTINUED | OUTPATIENT
Start: 2020-09-18 | End: 2020-09-18 | Stop reason: HOSPADM

## 2020-09-18 RX ORDER — CEFUROXIME AXETIL 250 MG/1
250 TABLET ORAL 2 TIMES DAILY
Qty: 20 TABLET | Refills: 0 | Status: SHIPPED | OUTPATIENT
Start: 2020-09-18 | End: 2020-09-28

## 2020-09-18 RX ORDER — SODIUM CHLORIDE 0.9 % (FLUSH) 0.9 %
10 SYRINGE (ML) INJECTION PRN
Status: DISCONTINUED | OUTPATIENT
Start: 2020-09-18 | End: 2020-09-18 | Stop reason: HOSPADM

## 2020-09-18 RX ORDER — SULFACETAMIDE SODIUM AND PREDNISOLONE SODIUM PHOSPHATE 100; 2.3 MG/ML; MG/ML
SOLUTION/ DROPS OPHTHALMIC
Status: COMPLETED | OUTPATIENT
Start: 2020-09-18 | End: 2020-09-18

## 2020-09-18 RX ORDER — HYDROMORPHONE HCL 110MG/55ML
0.5 PATIENT CONTROLLED ANALGESIA SYRINGE INTRAVENOUS EVERY 5 MIN PRN
Status: DISCONTINUED | OUTPATIENT
Start: 2020-09-18 | End: 2020-09-18 | Stop reason: HOSPADM

## 2020-09-18 RX ORDER — SUCCINYLCHOLINE CHLORIDE 20 MG/ML
INJECTION INTRAMUSCULAR; INTRAVENOUS PRN
Status: DISCONTINUED | OUTPATIENT
Start: 2020-09-18 | End: 2020-09-18 | Stop reason: SDUPTHER

## 2020-09-18 RX ORDER — SODIUM CHLORIDE, SODIUM LACTATE, POTASSIUM CHLORIDE, CALCIUM CHLORIDE 600; 310; 30; 20 MG/100ML; MG/100ML; MG/100ML; MG/100ML
INJECTION, SOLUTION INTRAVENOUS CONTINUOUS
Status: DISCONTINUED | OUTPATIENT
Start: 2020-09-18 | End: 2020-09-18 | Stop reason: HOSPADM

## 2020-09-18 RX ORDER — M-VIT,TX,IRON,MINS/CALC/FOLIC 27MG-0.4MG
1 TABLET ORAL DAILY
COMMUNITY

## 2020-09-18 RX ORDER — HYDROCODONE BITARTRATE AND ACETAMINOPHEN 5; 325 MG/1; MG/1
1 TABLET ORAL
Status: DISCONTINUED | OUTPATIENT
Start: 2020-09-18 | End: 2020-09-18 | Stop reason: HOSPADM

## 2020-09-18 RX ORDER — DEXAMETHASONE SODIUM PHOSPHATE 4 MG/ML
INJECTION, SOLUTION INTRA-ARTICULAR; INTRALESIONAL; INTRAMUSCULAR; INTRAVENOUS; SOFT TISSUE PRN
Status: DISCONTINUED | OUTPATIENT
Start: 2020-09-18 | End: 2020-09-18 | Stop reason: SDUPTHER

## 2020-09-18 RX ORDER — ONDANSETRON 2 MG/ML
INJECTION INTRAMUSCULAR; INTRAVENOUS PRN
Status: DISCONTINUED | OUTPATIENT
Start: 2020-09-18 | End: 2020-09-18 | Stop reason: SDUPTHER

## 2020-09-18 RX ORDER — LIDOCAINE HYDROCHLORIDE 10 MG/ML
1 INJECTION, SOLUTION EPIDURAL; INFILTRATION; INTRACAUDAL; PERINEURAL
Status: DISCONTINUED | OUTPATIENT
Start: 2020-09-18 | End: 2020-09-18 | Stop reason: HOSPADM

## 2020-09-18 RX ORDER — LABETALOL HYDROCHLORIDE 5 MG/ML
5 INJECTION, SOLUTION INTRAVENOUS EVERY 10 MIN PRN
Status: DISCONTINUED | OUTPATIENT
Start: 2020-09-18 | End: 2020-09-18 | Stop reason: HOSPADM

## 2020-09-18 RX ORDER — FENTANYL CITRATE 50 UG/ML
INJECTION, SOLUTION INTRAMUSCULAR; INTRAVENOUS PRN
Status: DISCONTINUED | OUTPATIENT
Start: 2020-09-18 | End: 2020-09-18 | Stop reason: SDUPTHER

## 2020-09-18 RX ORDER — MIDAZOLAM HYDROCHLORIDE 1 MG/ML
INJECTION INTRAMUSCULAR; INTRAVENOUS PRN
Status: DISCONTINUED | OUTPATIENT
Start: 2020-09-18 | End: 2020-09-18 | Stop reason: SDUPTHER

## 2020-09-18 RX ORDER — PROCHLORPERAZINE EDISYLATE 5 MG/ML
5 INJECTION INTRAMUSCULAR; INTRAVENOUS
Status: DISCONTINUED | OUTPATIENT
Start: 2020-09-18 | End: 2020-09-18 | Stop reason: HOSPADM

## 2020-09-18 RX ORDER — PROPOFOL 10 MG/ML
INJECTION, EMULSION INTRAVENOUS PRN
Status: DISCONTINUED | OUTPATIENT
Start: 2020-09-18 | End: 2020-09-18 | Stop reason: SDUPTHER

## 2020-09-18 RX ORDER — OXYMETAZOLINE HYDROCHLORIDE 0.05 G/100ML
SPRAY NASAL
Status: COMPLETED | OUTPATIENT
Start: 2020-09-18 | End: 2020-09-18

## 2020-09-18 RX ORDER — SODIUM CHLORIDE 9 MG/ML
INJECTION, SOLUTION INTRAVENOUS CONTINUOUS PRN
Status: DISCONTINUED | OUTPATIENT
Start: 2020-09-18 | End: 2020-09-18

## 2020-09-18 RX ORDER — LIDOCAINE HYDROCHLORIDE 20 MG/ML
INJECTION, SOLUTION EPIDURAL; INFILTRATION; INTRACAUDAL; PERINEURAL PRN
Status: DISCONTINUED | OUTPATIENT
Start: 2020-09-18 | End: 2020-09-18 | Stop reason: SDUPTHER

## 2020-09-18 RX ORDER — FENTANYL CITRATE 50 UG/ML
25 INJECTION, SOLUTION INTRAMUSCULAR; INTRAVENOUS EVERY 5 MIN PRN
Status: DISCONTINUED | OUTPATIENT
Start: 2020-09-18 | End: 2020-09-18 | Stop reason: HOSPADM

## 2020-09-18 RX ORDER — HYDRALAZINE HYDROCHLORIDE 20 MG/ML
5 INJECTION INTRAMUSCULAR; INTRAVENOUS EVERY 10 MIN PRN
Status: DISCONTINUED | OUTPATIENT
Start: 2020-09-18 | End: 2020-09-18 | Stop reason: HOSPADM

## 2020-09-18 RX ADMIN — ONDANSETRON 4 MG: 2 INJECTION INTRAMUSCULAR; INTRAVENOUS at 12:20

## 2020-09-18 RX ADMIN — CEFAZOLIN SODIUM 2 G: 10 INJECTION, POWDER, FOR SOLUTION INTRAVENOUS at 12:07

## 2020-09-18 RX ADMIN — SUCCINYLCHOLINE CHLORIDE 120 MG: 20 INJECTION, SOLUTION INTRAMUSCULAR; INTRAVENOUS at 12:16

## 2020-09-18 RX ADMIN — SODIUM CHLORIDE, POTASSIUM CHLORIDE, SODIUM LACTATE AND CALCIUM CHLORIDE: 600; 310; 30; 20 INJECTION, SOLUTION INTRAVENOUS at 12:09

## 2020-09-18 RX ADMIN — PROPOFOL 200 MG: 10 INJECTION, EMULSION INTRAVENOUS at 12:16

## 2020-09-18 RX ADMIN — SODIUM CHLORIDE, POTASSIUM CHLORIDE, SODIUM LACTATE AND CALCIUM CHLORIDE: 600; 310; 30; 20 INJECTION, SOLUTION INTRAVENOUS at 12:31

## 2020-09-18 RX ADMIN — FENTANYL CITRATE 100 MCG: 50 INJECTION, SOLUTION INTRAMUSCULAR; INTRAVENOUS at 12:13

## 2020-09-18 RX ADMIN — LIDOCAINE HYDROCHLORIDE 100 MG: 20 INJECTION, SOLUTION EPIDURAL; INFILTRATION; INTRACAUDAL; PERINEURAL at 12:13

## 2020-09-18 RX ADMIN — MIDAZOLAM 2 MG: 1 INJECTION INTRAMUSCULAR; INTRAVENOUS at 12:09

## 2020-09-18 RX ADMIN — DEXAMETHASONE SODIUM PHOSPHATE 8 MG: 4 INJECTION, SOLUTION INTRAMUSCULAR; INTRAVENOUS at 12:20

## 2020-09-18 ASSESSMENT — PULMONARY FUNCTION TESTS
PIF_VALUE: 20
PIF_VALUE: 3
PIF_VALUE: 17
PIF_VALUE: 1
PIF_VALUE: 17
PIF_VALUE: 16
PIF_VALUE: 17
PIF_VALUE: 0
PIF_VALUE: 15
PIF_VALUE: 16
PIF_VALUE: 16
PIF_VALUE: 17
PIF_VALUE: 1
PIF_VALUE: 17
PIF_VALUE: 16
PIF_VALUE: 17
PIF_VALUE: 1
PIF_VALUE: 17
PIF_VALUE: 17
PIF_VALUE: 16
PIF_VALUE: 17
PIF_VALUE: 1
PIF_VALUE: 4
PIF_VALUE: 17
PIF_VALUE: 16
PIF_VALUE: 9
PIF_VALUE: 13
PIF_VALUE: 16
PIF_VALUE: 17
PIF_VALUE: 16
PIF_VALUE: 16
PIF_VALUE: 17
PIF_VALUE: 17
PIF_VALUE: 1
PIF_VALUE: 18
PIF_VALUE: 16
PIF_VALUE: 16
PIF_VALUE: 15
PIF_VALUE: 16
PIF_VALUE: 17
PIF_VALUE: 2
PIF_VALUE: 17
PIF_VALUE: 17
PIF_VALUE: 16
PIF_VALUE: 17

## 2020-09-18 ASSESSMENT — PAIN - FUNCTIONAL ASSESSMENT: PAIN_FUNCTIONAL_ASSESSMENT: 0-10

## 2020-09-18 ASSESSMENT — ENCOUNTER SYMPTOMS: SHORTNESS OF BREATH: 0

## 2020-09-18 ASSESSMENT — PAIN DESCRIPTION - DESCRIPTORS: DESCRIPTORS: STABBING

## 2020-09-18 NOTE — TELEPHONE ENCOUNTER
Pt  called stating his wife just had minor surgery with Dr. Rene Loera and an antibiotic was supposed to be called in after. Please advise?

## 2020-09-18 NOTE — PROGRESS NOTES
Pt discharged via wheelchair. Pt discharged with ear drops and all other belongings.  taking stable pt home.

## 2020-09-18 NOTE — TELEPHONE ENCOUNTER
Correct. Cefuroxime 250 mg BID for 10 days. Will need first dose this evening. Will send to pharmacy.

## 2020-09-18 NOTE — H&P
Date of Surgery Update:  Rajesh Ramirez was seen, history and physical examination reviewed, and patient examined by me today. There have been no significant clinical changes since the completion of the previous history and physical.    The risk, benefits, and alternatives of the proposed procedure have been explained to the patient (or appropriate guardian) and understanding verbalized. All questions answered. Patient wishes to proceed.     Electronically signed by: Sue Ortiz MD,9/18/2020,11:15 AM

## 2020-09-18 NOTE — PROGRESS NOTES
Discharge instructions review with patient and  Zelalem Dyer. All home medications have been reviewed, pt v/u. Discharge instructions signed.  reports abx was supposed to be ordered by Dr Moncho Ramirez. Awaiting for response from Dr Moncho Ramirez at this time.

## 2020-09-18 NOTE — ANESTHESIA PRE PROCEDURE
PRESSURE EQUALIZING TUBE, BIOPSY OF NASOPHARYNX performed by Martha Riojas MD at [address] [place] [address]  2002    Dr. Mark Mosqueda Bilateral 2002    Dr. Jenifer Macedo       Social History:    Social History     Tobacco Use    Smoking status: Never Smoker    Smokeless tobacco: Never Used   Substance Use Topics    Alcohol use: No                                Counseling given: Not Answered      Vital Signs (Current):   Vitals:    09/15/20 1417 09/18/20 0955   BP:  101/65   Pulse:  79   Resp:  16   Temp:  98.9 °F (37.2 °C)   TempSrc:  Temporal   SpO2:  97%   Weight: 180 lb (81.6 kg) 181 lb 4 oz (82.2 kg)   Height: 5' 3\" (1.6 m) 5' 3\" (1.6 m)                                              BP Readings from Last 3 Encounters:   09/18/20 101/65   09/09/20 113/67   11/08/19 115/76       NPO Status: Time of last liquid consumption: 2200                        Time of last solid consumption: 1900                        Date of last liquid consumption: 09/17/20                        Date of last solid food consumption: 09/17/20    BMI:   Wt Readings from Last 3 Encounters:   09/18/20 181 lb 4 oz (82.2 kg)   09/09/20 182 lb (82.6 kg)   09/23/19 194 lb 3.2 oz (88.1 kg)     Body mass index is 32.11 kg/m². CBC:   Lab Results   Component Value Date    WBC 10.1 05/02/2013    RBC 3.63 05/02/2013    HGB 11.6 05/02/2013    HCT 34.8 05/02/2013    MCV 95.9 05/02/2013    RDW 13.6 05/02/2013     05/02/2013       CMP: No results found for: NA, K, CL, CO2, BUN, CREATININE, GFRAA, AGRATIO, LABGLOM, GLUCOSE, PROT, CALCIUM, BILITOT, ALKPHOS, AST, ALT    POC Tests: No results for input(s): POCGLU, POCNA, POCK, POCCL, POCBUN, POCHEMO, POCHCT in the last 72 hours.     Coags: No results found for: PROTIME, INR, APTT    HCG (If Applicable):   Lab Results   Component Value Date    PREGTESTUR Negative 09/18/2020        ABGs: No results found for: PHART, PO2ART, MGU7CUN, QLM1VWG, BEART, K1GNUGRH     Type & Screen (If Applicable):  Lab Results   Component Value Date    LABABO A 05/01/2013    LABRH Negative 05/01/2013       Drug/Infectious Status (If Applicable):  No results found for: HIV, HEPCAB    COVID-19 Screening (If Applicable):   Lab Results   Component Value Date    COVID19 NOT DETECTED 09/15/2020         Anesthesia Evaluation  Patient summary reviewed and Nursing notes reviewed no history of anesthetic complications:   Airway: Mallampati: I  TM distance: >3 FB   Neck ROM: full  Mouth opening: > = 3 FB Dental: normal exam         Pulmonary:       (-) asthma and shortness of breath                           Cardiovascular:  Exercise tolerance: good (>4 METS),       (-) hypertension and  angina                Neuro/Psych:      (-) CVA           GI/Hepatic/Renal:        (-) GERD and liver disease       Endo/Other:        (-) diabetes mellitus, hypothyroidism               Abdominal:           Vascular:     - PVD. Anesthesia Plan      general     ASA 1       Induction: intravenous. MIPS: Postoperative opioids intended and Prophylactic antiemetics administered. Anesthetic plan and risks discussed with patient. Use of blood products discussed with patient whom. Plan discussed with CRNA.                   Mehul Wise MD   9/18/2020

## 2020-09-18 NOTE — PROGRESS NOTES
No word from Dr Raymond Villeda at this time. Pt  Barry Stallings given Dr Raymond Villeda office number for abx to be called in. Pt and  agreeable.

## 2020-09-29 NOTE — OP NOTE
[facility]                     [facility], [address]                                OPERATIVE REPORT    PATIENT NAME: Christopher Arredondo                  :        1980  MED REC NO:   [id number]                          ROOM:  ACCOUNT NO:   [de-identified]                           ADMIT DATE: 2020  PROVIDER:     Lea Green MD    DATE OF PROCEDURE:  2020    LOCATION:  [address] [place] [address] Road:  1. 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 2.  Chronic left conductive hearing loss. 3.  Bilateral chronic eustachian tube dysfunction. POSTOPERATIVE DIAGNOSES:  1. 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 2.  Chronic left conductive hearing loss. 3.  Bilateral chronic eustachian tube dysfunction. OPERATION PERFORMED:  1. Left myringotomy and insertion of pressure equalizing tube. 2.  Nasal endoscopy and balloon dilation of bilateral eustachian tubes. SURGEON:  Herb Lei. MD Garrett    ANESTHESIA:  General.      INDICATIONS FOR OPERATION:  This 48-year-old white female presented today for left myringotomy and insertion of a left pressure equalizing tube, and nasal endoscopy and balloon dilation of bilateral eustachian tubes. She has been experiencing bilateral ear fullness and hearing loss, with adhesive middle ear disease and atelectasis of the left middle ear space, which has been unresponsive to Flonase and Allegra. She has had some success with autoinflation maneuvers, but continues to have symptoms and problems. She considered alternatives of further management and decided to proceed with left myringotomy and tube and bilateral eustachian tube balloon dilation procedure.   Preoperatively, the otomicroscopy revealed the left TM was dull, thickened, with moderately severe negative middle ear pressure and was retracted onto the promontory and incudostapedial joint. But, the TM did elevate completely with autoinflation but then retracted again  immediately. The right TM was mildly retracted, nonerythematous, dull with mild middle ear negative pressure. Preoperative audiogram showed borderline to mild conductive hearing loss in the right ear and mild-to-moderate conductive hearing loss in the left ear. The patient considered alternatives of further management and decided to proceed with surgery. INTRAOPERATIVE FINDINGS:  The left tympanic membrane was non-erythematous, dull and retracted onto the incudostapedial joint and onto the promontory, with early adhesive middle ear disease, but elevated off of both and into normal contour after myringotomy and suction of the TM. The right tympanic membrane was mildly erythematous with no middle ear effusion. The nasopharynx and eustachian orifices and torus tubarius appeared to be normal bilaterally. There was moderate septal deviation to the left with left inferior spur. Endoscopic intranasal exam was otherwise normal.  A Paparella PE tube was placed in the left tympanic membrane. DESCRIPTION OF OPERATION:  The patient was taken to the operating room and placed in the supine position. Adequate and uncomplicated general anesthesia was induced and maintained by the attending anesthetist using oroendotracheal tube technique. A time-out was held and the patient was identified and the procedure confirmed. The patient was then positioned and draped in a sterile manner. Under the operating ear microscope, the left external auditory canal was cleared of cerumen and debris. A radial myringotomy incision was made in the anterior inferior quadrant of the left tympanic membrane, middle ear space aspirated, and a Paparella type 1 tympanostomy tube inserted in the left myringotomy incision without difficulty followed by Vasocidin ophthalmic solution and a small ball of cotton.   The right ear was examined under anesthesia with the above findings. The patient was then repositioned for the eustachian tube dilation procedure. The right and left nasal cavities were packed with three surgical cottonoids, moistened with 0.05% oxymetazoline solution. After approximately 10 minutes, these were removed. The procedure began on the right. The endoscope was passed and nasopharyngoscopy performed with the above findings. Then, under endoscopic visualization, the Acclarent AERA eustachian tube balloon dilation device was inserted along the floor of the nose and directed to the eustachian tube orifice. Then, with direct visualization using the nasal telescope, the right eustachian tube was cannulated. The balloon was inflated for the appropriate two minutes and then deflated and removed. The balloon dilation dvice was then inserted along the left floor of the nose and with visualization using the nasal telescope, the left eustachian tube orifice was cannulated. The balloon was then inflated for approximately two minutes and then deflated. The device was then removed. The patient tolerated the procedure well and was then awakened and extubated and taken to the recovery room in stable condition. Estimated blood loss was negligible.         Crystal Izquierdo MD    D: 09/28/2020 16:55:51       T: 09/29/2020 3:36:55     [id number]  Job#: [id number]     Doc#: [id number]    CC:

## 2020-10-03 PROBLEM — H92.03 OTALGIA OF BOTH EARS: Status: ACTIVE | Noted: 2020-10-03

## 2020-10-08 ENCOUNTER — OFFICE VISIT (OUTPATIENT)
Dept: ENT CLINIC | Age: 40
End: 2020-10-08
Payer: OTHER GOVERNMENT

## 2020-10-08 VITALS — HEART RATE: 75 BPM | SYSTOLIC BLOOD PRESSURE: 122 MMHG | DIASTOLIC BLOOD PRESSURE: 72 MMHG | TEMPERATURE: 97.1 F

## 2020-10-08 PROCEDURE — 99213 OFFICE O/P EST LOW 20 MIN: CPT | Performed by: OTOLARYNGOLOGY

## 2020-10-08 RX ORDER — CEFDINIR 300 MG/1
600 CAPSULE ORAL DAILY
Qty: 28 CAPSULE | Refills: 0 | Status: SHIPPED | OUTPATIENT
Start: 2020-10-08 | End: 2020-10-22

## 2020-10-08 NOTE — PROGRESS NOTES
SINUSES:  The maxillary and frontal sinuses were tender, 2-3 /10 bilaterally. LIPS, TEETH, AND GUMS:   Normal.  OROPHARYNX/ORAL CAVITY:  Oral mucosa, hard and soft palates, tongue, and pharynx were normal.      NECK:  No masses or tenderness. The laryngeal cartilages and hyoid bone were normal, with normal laryngeal crepitus. No abnormal appearance, asymmetry or abnormal tracheal position. PALPATION OF LYMPH NODES, CERVICAL, FACIAL AND SUPRACLAVICULAR:  No lymphadenopathy. Reagan Purpura / Blanca Frost / Nidia :       Leila Dakins was seen today for ear problem. Diagnoses and all orders for this visit:    Acute rhinosinusitis  -     cefdinir (OMNICEF) 300 MG capsule; Take 2 capsules by mouth daily for 14 days Take both capsules together at the same time, once daily. Seasonal allergic rhinitis due to pollen  -     RESPIRATORY ALLERGEN PROFILE; Future    ETD (Eustachian tube dysfunction), bilateral    Patent pressure equalization (PE) tube    Middle ear atelectasis, left         RECOMMENDATIONS / PLAN:    1. See patient instructions on file for this visit, which were fully discussed with the patient. 2. Return in about 2 months (around 12/8/2020) for recheck, follow-up and sooner if condition worsens.

## 2020-10-08 NOTE — PATIENT INSTRUCTIONS
RHINOSINUSITIS CARE  · Take Probiotic while you are taking antibiotics, to prevent diarrhea, stomach upset, pseudomembranous colitis, and C. difficile diarrhea. This may be obtained at your pharmacy or health food store. Alternatively, you may eat one cup of yogurt with active or live cultures twice daily while taking the antibiotic. Continue for two to three days after completion of the antibiotic. · Use a 12 hour decongestant spray, 0.05% oxymetazoline (e.g. Afrin, Duration, 4-Way). Spray each nostril twice three times a day for three days, then two times a day for 2 days, and then stop for two days and then repeat the cycle once. · Take one Mucinex-D (red orange box) maximum strength tablet each morning and one Mucinex (blue box) maximum strength tablet each evening, about 12 hours later, daily for the next ten days. · Use fluticasone 2 sprays in each nostril once daily. · It may take several days to several weeks for your sinusitis to clear up. It is important to take all your medications as prescribed. Please continue your antibiotics as prescribed.     · Please call the office if your condition worsens or if symptoms persist after treatment is completed.        ===================================================================      ADVERSE AND SIDE EFFECTS OF MEDICATIONS:    Please be aware of the following possible adverse reactions, side effects, and complications of the following medications, including, but not limited to: allergic reaction, interactions with other medications, nausea, headache, diarrhea, persistent symptoms, failure to improve, and the following:     Omnicef (antibiotic):  diarrhea, colitis (severe infection and inflammation of the large intestine), pseudomembranous colitis (severe infection and inflammation of the colon, usually due to C. difficile bacteria)  yeast or fungal  infections, Andrade-Jeff syndrome (very rare necrotic skin reaction with peeling of skin, blisters and arthritis), persistent symptoms/infection, and failure to improve. Fluticasone:  nosebleed, burning sensation, atrophy of nasal mucosa, septal ulceration or perforation, nasal irritation, nasal yeast infection,  drowsiness, bad taste.      ~~~>>> Also please read all information given to you by the pharmacist.             [address]  · Do not allow water to get into your left ear, as this may cause, or worsen, an ear infection. · Before bathing, showering or washing your hair, a plug of cotton coated with petroleum jelly should be placed in the opening of your ear canal.  After bathing the cotton should be removed and the outer part of your ear dried with a soft towel. Alternatively, you may use a silicone putty ear plug purchased from your pharmacy. · Swimming may be permitted if you wear adequate ear plugs. · If water does enteryour ear, drain the water out into a tissue or cotton ball. Then, instill into your ear four drops of the eardrops you were prescribed/given after surgery. Call the office if you develop ear pain or drainage. Patient Education        cefdinir  Pronunciation:  HUSAM trevizo  Brand:  Bonita Cranker Omni-Pac  What is the most important information I should know about cefdinir? Do not take this medicine if you are allergic to cefdinir, or to similar antibiotics, such as Ceftin, Cefzil, Keflex, and others. What is cefdinir? Cefdinir is a cephalosporin (SEF a low spor in) antibiotic that is used to treat many different types of infections caused by bacteria. Cefdinir may also be used for purposes not listed in this medication guide. What should I discuss with my healthcare provider before taking cefdinir?   You should not take this medicine if you are allergic to cefdinir or to other cephalosporin antibiotics, such as:  · cefaclor;  · cefadroxil;  · cefazolin;  · cefditoren;  · cefpodoxime;  · cefprozil;  · ceftibuten;  · cefuroxime;  · cephalexin; or  · cephradine, and others. Tell your doctor if you have ever had:  · kidney disease (or if you are on dialysis);  · intestinal problems, such as colitis; or  · an allergy to any drugs (especially penicillins). Cefdinir liquid contains sucrose. Talk to your doctor before using this form of cefdinir if you have diabetes. Tell your doctor if you are pregnant or breastfeeding. How should I take cefdinir? Follow all directions on your prescription label and read all medicine guides or instruction sheets. Use the medicine exactly as directed. Shake the oral suspension (liquid) before you measure a dose. Use the dosing syringe provided, or use a medicine dose-measuring device (not a kitchen spoon). You may take cefdinir with or without food. Use this medicine for the full prescribed length of time, even if your symptoms quickly improve. Skipping doses can increase your risk of infection that is resistant to medication. Cefdinir will not treat a viral infection such as the flu or a common cold. Cefdinir can affect the results of certain medical tests. Tell any doctor who treats you that you are using cefdinir. Store at room temperature away from moisture and heat. Throw away any unused cefdinir liquid that is older than 10 days. What happens if I miss a dose? Take the medicine as soon as you can, but skip the missed dose if it is almost time for your next dose. Do not take two doses at one time. What happens if I overdose? Seek emergency medical attention or call the Poison Help line at 1-550.770.5138. Overdose symptoms may include nausea, vomiting, stomach pain, diarrhea, or a seizure. What should I avoid while taking cefdinir? Avoid using antacids or mineral supplements that contain aluminum, magnesium, or iron within 2 hours before or after taking cefdinir. Antacids or iron can make it harder for your body to absorb cefdinir. This does not include baby formula fortified with iron.    Antibiotic medicines can cause diarrhea, which may be a sign of a new infection. If you have diarrhea that is watery or bloody, call your doctor before using anti-diarrhea medicine. What are the possible side effects of cefdinir? Get emergency medical help if you have signs of an allergic reaction (hives, difficult breathing, swelling in your face or throat) or a severe skin reaction (fever, sore throat, burning eyes, skin pain, red or purple skin rash with blistering and peeling). Call your doctor at once if you have:  · severe stomach pain, diarrhea that is watery or bloody (even if it occurs months after your last dose);  · fever, chills, body aches, flu symptoms;  · pale skin, easy bruising, unusual bleeding;  · seizure (convulsions);  · fever, weakness, confusion;  · dark colored urine, jaundice (yellowing of the skin or eyes); or  · kidney problems --little or no urination, swelling in your feet or ankles, feeling tired or short of breath. Common side effects may include:  · nausea, vomiting, stomach pain, diarrhea;  · vaginal itching or discharge;  · headache; or  · rash (including diaper rash in an infant taking liquid cefdinir. This is not a complete list of side effects and others may occur. Call your doctor for medical advice about side effects. You may report side effects to FDA at 4-167-FDA-7694. What other drugs will affect cefdinir? Tell your doctor about all your other medicines, especially:  · probenecid; or  · vitamin or mineral supplements that contain iron. This list is not complete. Other drugs may affect cefdinir, including prescription and over-the-counter medicines, vitamins, and herbal products. Not all possible drug interactions are listed here. Where can I get more information? Your pharmacist can provide more information about cefdinir.   Remember, keep this and all other medicines out of the reach of children, never share your medicines with others, and use this medication only for the Rahel Gala  What is the most important information I should know about fluticasone nasal?  Follow all directions on your medicine label and package. Tell each of your healthcare providers about all your medical conditions, allergies, and all medicines you use. What is fluticasone nasal?  Fluticasone nasal (for the nose) is a steroid medicine that is used to treat nasal congestion, sneezing, runny nose, and itchy or watery eyes caused by seasonal or year-round allergies. The Rahel Gala brand of this medicine is for use only in adults. Veramyst may be used in children as young as 3years old. Flonase is for use in adults and children who are at least 3years old. Fluticasone nasal may also be used for purposes not listed in this medication guide. What should I discuss with my healthcare provider before using fluticasone nasal?  You should not use fluticasone nasal if you are allergic to it. Fluticasone can weaken your immune system,  making it easier for you to get an infection or worsening an infection you already have or recently had. Tell your doctor about any illness or infection you have had within the past several weeks. Tell your doctor if you have ever had:  · sores or ulcers inside your nose;  · injury of or surgery on your nose;  · glaucoma or cataracts;  · liver disease;  · diabetes;  · a weak immune system; or  · any type of infection (bacterial, fungal, viral, or parasitic). If you use fluticasone nasal without a prescription and you have any medical conditions, ask a doctor or pharmacist if this medicine is safe for you. Tell your doctor if you are pregnant or breast-feeding. How should I use fluticasone nasal?  Follow all directions on your prescription label and read all medication guides or instruction sheets. Use the medicine exactly as directed. Do not share this medicine with another person, even if they have the same symptoms you have.   Your dose will depend on the fluticasone brand or strength effects of fluticasone nasal?  Get emergency medical help if you have signs of an allergic reaction: hives, rash; feeling light-headed; difficult breathing; swelling of your face, lips, tongue, or throat. Call your doctor at once if you have:  · severe or ongoing nosebleeds;  · noisy breathing, runny nose, or crusting around your nostrils;  · redness, sores, or white patches in your mouth or throat;  · fever, chills, body aches;  · blurred vision, eye pain, or seeing halos around lights;  · any wound that will not heal; or  · signs of a hormonal disorder --worsening tiredness or muscle weakness, feeling light-headed, nausea, vomiting. Steroid medicine can affect growth in children. Tell your doctor if your child is not growing at a normal rate while using this medicine. Common side effects may include:  · minor nosebleed, burning or itching in your nose;  · sores or white patches inside or around your nose;  · cough, trouble breathing;  · headache, back pain;  · sinus pain, sore throat, fever; or  · nausea, vomiting. This is not a complete list of side effects and others may occur. Call your doctor for medical advice about side effects. You may report side effects to FDA at 0-198-FDA-4204. What other drugs will affect fluticasone nasal?  Tell your doctor about all your other medicines, especially:  · antifungal medicine; or  · antiviral medicine to treat hepatis C or HIV/AIDS. This list is not complete. Other drugs may affect fluticasone nasal, including prescription and over-the-counter medicines, vitamins, and herbal products. Not all possible drug interactions are listed here. Where can I get more information? Your pharmacist can provide more information about fluticasone nasal.  Remember, keep this and all other medicines out of the reach of children, never share your medicines with others, and use this medication only for the indication prescribed.    Every effort has been made to ensure that the

## 2020-10-12 ENCOUNTER — HOSPITAL ENCOUNTER (OUTPATIENT)
Age: 40
Discharge: HOME | End: 2020-10-12
Payer: OTHER GOVERNMENT

## 2020-10-12 PROCEDURE — 86003 ALLG SPEC IGE CRUDE XTRC EA: CPT

## 2020-10-12 PROCEDURE — 82785 ASSAY OF IGE: CPT

## 2020-10-19 LAB
2000687N OAK TREE IGE: <0.1 KU/L
ALLERGEN ASPERGILLUS ALTERNATA IGE: <0.1 KU/L
ALLERGEN ASPERGILLUS FUMIGATUS IGE: <0.1 KU/L
ALLERGEN BERMUDA GRASS IGE: <0.1 KU/L
ALLERGEN BIRCH IGE: <0.1 KU/L
ALLERGEN CAT DANDER IGE: <0.1 KU/L
ALLERGEN COMMON SHORT RAGWEED IGE: <0.1 KU/L
ALLERGEN COTTONWOOD: <0.1 KU/L
ALLERGEN DOG DANDER IGE: <0.1 KU/L
ALLERGEN ELM IGE: <0.1 KU/L
ALLERGEN FUNGI/MOLD M.RACEMOSUS IGE: <0.1 KU/L
ALLERGEN GERMAN COCKROACH IGE: <0.1 KU/L
ALLERGEN HORMODENDRUM HORDEI IGE: <0.1 KU/L
ALLERGEN MAPLE/BOX ELDER IGE: <0.1 KU/L
ALLERGEN MITE DUST FARINAE IGE: <0.1 KU/L
ALLERGEN MITE DUST PTERONYSSINUS IGE: <0.1 KU/L
ALLERGEN MOUNTAIN CEDAR: <0.1 KU/L
ALLERGEN MOUSE EPITHELIA IGE: <0.1 KU/L
ALLERGEN PECAN TREE IGE: <0.1 KU/L
ALLERGEN PENICILLIUM NOTATUM: <0.1 KU/L
ALLERGEN ROUGH PIGWEED (W14) IGE: <0.1 KU/L
ALLERGEN RUSSIAN THISTLE IGE: <0.1 KU/L
ALLERGEN SEE NOTE: NORMAL
ALLERGEN SHEEP SORREL (W18) IGE: <0.1 KU/L
ALLERGEN TIMOTHY GRASS: <0.1 KU/L
ALLERGEN TREE SYCAMORE: <0.1 KU/L
ALLERGEN WALNUT TREE IGE: <0.1 KU/L
ALLERGEN WHITE MULBERRY TREE, IGE: <0.1 KU/L
ALLERGEN, TREE, WHITE ASH IGE: <0.1 KU/L
IGE: 88 KU/L

## 2020-12-04 ENCOUNTER — HOSPITAL ENCOUNTER (OUTPATIENT)
Dept: MAMMOGRAPHY | Age: 40
Discharge: HOME | End: 2020-12-04
Payer: OTHER GOVERNMENT

## 2020-12-04 PROCEDURE — 77067 SCR MAMMO BI INCL CAD: CPT

## 2020-12-08 ENCOUNTER — OFFICE VISIT (OUTPATIENT)
Dept: ENT CLINIC | Age: 40
End: 2020-12-08
Payer: OTHER GOVERNMENT

## 2020-12-08 VITALS — TEMPERATURE: 97.1 F | SYSTOLIC BLOOD PRESSURE: 112 MMHG | HEART RATE: 78 BPM | DIASTOLIC BLOOD PRESSURE: 79 MMHG

## 2020-12-08 PROCEDURE — 99213 OFFICE O/P EST LOW 20 MIN: CPT | Performed by: OTOLARYNGOLOGY

## 2020-12-08 NOTE — PATIENT INSTRUCTIONS
Please schedule an audiogram (hearing test). NO Q-TIPS OR OTHER INSTRUMENTS/OBJECTS IN THE EARS   You should never clean your ears with a Q-tip, cotton tipped applicator, Vivian pin, paper clip, safety pin, pen cap, or any other instrument. This will tend to push wax in deeper and pack the ear canal with wax. There is a high risk and danger of this practice, especially rupture of ear drum, dislocation or other damage to ossicles, and permanent, irreversible, and irreparable hearing loss. It may cause inflammation and irritation of the ear canal and cause itching or pain. I recommend only use of one the several ear wax removal kits available \"over the counter\" if you feel a need to try to remove ear wax. For example, Murine, Bausch and Lomb, NeilMed, or Debrox ear wax removal kits may be used for ear wax removal, as needed. No other methods should be self used for cleaning your ears.

## 2020-12-08 NOTE — PROGRESS NOTES
LIPS, TEETH, AND GUMS:   Normal.   OROPHARYNX/ORAL CAVITY:  Oral mucosa, hard and soft palates, tongue, and pharynx were normal.      NECK:  No masses or tenderness. The laryngeal cartilages and hyoid bone were normal, with normal laryngeal crepitus. No abnormal appearance, asymmetry or abnormal tracheal position. PALPATION OF LYMPH NODES, CERVICAL, FACIAL AND SUPRACLAVICULAR:  No lymphadenopathy. IN-VITRO ALLERGY TESTING  Respiratory in-vitro allergen panel was negative. See report for details. IMPRESSION / Birder Pickler / Megan Todd:       Lin Tomlinson was seen today for sinusitis and hearing problem. Diagnoses and all orders for this visit:    Decreased hearing of both ears  -     Cris Reddy [facility] Joy DANIELS, Audiology, [facility]    Acute rhinosinusitis  Comments:  Appears to be resolved. Seasonal allergic rhinitis due to pollen    Middle ear atelectasis, left    ETD (Eustachian tube dysfunction), bilateral         RECOMMENDATIONS / PLAN:    1. Audiogram   2. Return if symptoms worsen or, for any further ENT or sinus problems or symptoms. Patient Instructions   Please schedule an audiogram (hearing test).        NO Q-TIPS OR OTHER INSTRUMENTS/OBJECTS IN THE EARS You should never clean your ears with a Q-tip, cotton tipped applicator, Vivian pin, paper clip, safety pin, pen cap, or any other instrument. This will tend to push wax in deeper and pack the ear canal with wax. There is a high risk and danger of this practice, especially rupture of ear drum, dislocation or other damage to ossicles, and permanent, irreversible, and irreparable hearing loss. It may cause inflammation and irritation of the ear canal and cause itching or pain. I recommend only use of one the several ear wax removal kits available \"over the counter\" if you feel a need to try to remove ear wax. For example, Murine, Bausch and Lomb, NeilMed, or Debrox ear wax removal kits may be used for ear wax removal, as needed. No other methods should be self used for cleaning your ears.

## 2021-01-11 ENCOUNTER — PROCEDURE VISIT (OUTPATIENT)
Dept: AUDIOLOGY | Age: 41
End: 2021-01-11
Payer: OTHER GOVERNMENT

## 2021-01-11 DIAGNOSIS — H90.A32 MIXED CONDUCTIVE AND SENSORINEURAL HEARING LOSS OF LEFT EAR WITH RESTRICTED HEARING OF RIGHT EAR: ICD-10-CM

## 2021-01-11 DIAGNOSIS — H69.91 TYPE C TYMPANOGRAM OF RIGHT EAR: Primary | ICD-10-CM

## 2021-01-11 PROCEDURE — 92567 TYMPANOMETRY: CPT | Performed by: AUDIOLOGIST

## 2021-01-11 PROCEDURE — 92557 COMPREHENSIVE HEARING TEST: CPT | Performed by: AUDIOLOGIST

## 2021-01-11 NOTE — PROGRESS NOTES
[facility]) Physicians  Division of Audiology/Otolaryngology        PCP: Yari Michele MD  MRN:     CHIEF COMPLAINT: Hearing Loss      HISTORY OF PRESENT ILLNESS  Bryan Ruth was seen for hearing and middle ear evaluation. Otolaryngology is referral source of today's appointment. Patient presents with hearing concerns. She reports difficulty from both ears. AUDIOGRAM & IMMITTANCE    Hearing status showed normal-mild loss of sensory nature, from right ear. Mild-moderate, mixed loss of left ear. Loss is asymmetrical- greater on left. Good speech discernment demonstrated in quiet, at elevated levels. Immittance testing is consistent with normal middle ear status (Type B tympanogram) of left ear consistent with patent PE tube, Type C tympanogram of right, consistent with negative middle ear pressure. Elevated ipsilateral reflex from right ear elicited.                RECOMMENDATIONS / PLAN:    Follow medical recommendations- ENT to advise

## 2022-01-03 ENCOUNTER — OFFICE VISIT (OUTPATIENT)
Dept: ENT CLINIC | Age: 42
End: 2022-01-03
Payer: OTHER GOVERNMENT

## 2022-01-03 VITALS — SYSTOLIC BLOOD PRESSURE: 127 MMHG | TEMPERATURE: 97.7 F | HEART RATE: 88 BPM | DIASTOLIC BLOOD PRESSURE: 85 MMHG

## 2022-01-03 DIAGNOSIS — G47.33 OBSTRUCTIVE SLEEP APNEA SYNDROME: Chronic | ICD-10-CM

## 2022-01-03 DIAGNOSIS — J34.2 NASAL SEPTAL DEVIATION: Primary | Chronic | ICD-10-CM

## 2022-01-03 DIAGNOSIS — J34.89 NASAL OBSTRUCTION: Chronic | ICD-10-CM

## 2022-01-03 DIAGNOSIS — J34.3 HYPERTROPHY OF BOTH INFERIOR NASAL TURBINATES: Chronic | ICD-10-CM

## 2022-01-03 PROCEDURE — 99214 OFFICE O/P EST MOD 30 MIN: CPT | Performed by: OTOLARYNGOLOGY

## 2022-01-03 ASSESSMENT — ENCOUNTER SYMPTOMS
RHINORRHEA: 0
SORE THROAT: 0
VOICE CHANGE: 0
TROUBLE SWALLOWING: 0
SINUS PAIN: 1
SINUS PRESSURE: 1

## 2022-01-03 NOTE — PROGRESS NOTES
[facility]       PCP:  Abbe Matamoros MD      [facility]  Chief Complaint   Patient presents with    Nasal Congestion    Nasal deviated septum       HISTORY OF PRESENT ILLNESS       Dick Le is a 42 y.o. female here for evaluation and treatment of nasal congestion, which she feels is due to nasal septal deviation with difficulty breathing through the nose. \"I've notice an increase in headaches here and here [above and below left eye], for past 3-6 months. Once a week and now two to three times per week. She will take various OTC meds and the headache will last most of the day. He also stated that she has been snoring which \"wakes up my  but I'm not registering that I'm waking up. \"   has witnessed sleep apnea. She stated that she often wakes up feeling unrefreshed. She stated that she often falls asleep easily during day and feels sleepy during day time, including at work. He stated that she has had no sinus infections in the past year, and had one or two episodes of sinusitis per year, over the prior two years       REVIEW OF SYSTEMS   Review of Systems   Constitutional: Negative for chills, fever and unexpected weight change. HENT: Positive for congestion (left side, constant, for about 6-12 months), sinus pressure and sinus pain. Negative for ear discharge, ear pain, hearing loss, nosebleeds, rhinorrhea, sore throat, tinnitus, trouble swallowing and voice change. PAST MEDICAL HISTORY    No past medical history on file.       Past Surgical History:   Procedure Laterality Date    MYRINGOTOMY Left 9/18/2020    LEFT MYRINGOTOMY AND INSERTION OF PRESSURE EQUALIZING TUBE, BALLOON DILATION OF BILATERAL EUSTACHIAN TUBES performed by Rodney Magana MD at [address] N/A 8/15/2019    LEFT MYRINGOTOMY AND INSERTION OF LEFT PRESSURE EQUALIZING TUBE, BIOPSY OF NASOPHARYNX performed by Von Lewis MD at [address] [place] [address]  2002    Dr. Naila Aceves Bilateral 2002    Dr. Angie Hodgkins:    01/03/22 1107   BP: 127/85   Site: Right Upper Arm   Position: Sitting   Cuff Size: Medium Adult   Pulse: 88   Temp: 97.7 °F (36.5 °C)   TempSrc: Temporal       Physical Exam  Vitals reviewed. Constitutional:       General: She is awake. She is not in acute distress. Appearance: Normal appearance. She is well-developed. She is not ill-appearing or toxic-appearing. HENT:      Head: Normocephalic and atraumatic. Salivary Glands: Right salivary gland is not diffusely enlarged or tender. Left salivary gland is not diffusely enlarged or tender. Right Ear: Ear canal and external ear normal.      Left Ear: Ear canal and external ear normal.      Ears:      Comments: Bilateral otomicroscopy performed. Left TM dull, thickened, nonerythematous, retracted onto I-S joint, with no ONI. Right TM dull, thick, nonerythematous, with no retraction and no ONI. Nose: Septal deviation (moderate to left with left inferior spur and more severely deviated to left in the posterior area.) present. No nasal deformity, mucosal edema, congestion or rhinorrhea. Right Nostril: Occlusion present. Left Nostril: Occlusion present. Right Turbinates: Enlarged. Left Turbinates: Not enlarged. Right Sinus: No maxillary sinus tenderness or frontal sinus tenderness. Left Sinus: Maxillary sinus tenderness (mildly) and frontal sinus tenderness (mildly) present. Mouth/Throat:      Lips: Pink. No lesions. Mouth: Mucous membranes are moist. No oral lesions. Tongue: No lesions. Palate: No mass and lesions. Pharynx: Oropharynx is clear. Uvula midline. No oropharyngeal exudate or posterior oropharyngeal erythema. Tonsils: No tonsillar exudate or tonsillar abscesses. 2+ on the right. 2+ on the left. Neck:      Thyroid: No thyroid mass, thyromegaly or thyroid tenderness. Trachea: No tracheal deviation. Comments: No cervical masses or tenderness. Musculoskeletal:      Cervical back: Normal range of motion and neck supple. Lymphadenopathy:      Cervical: No cervical adenopathy. Neurological:      Mental Status: She is alert. IMPRESSION / Tanvi Wade / Julianna Calvillo was seen today for nasal congestion and nasal deviated septum. Diagnoses and all orders for this visit:    Nasal septal deviation    Hypertrophy of both inferior nasal turbinates    Nasal obstruction         RECOMMENDATIONS/PLAN      1. Septal reconstruction and inferior turbinate reduction with partial excision, outfracture, and/or bipolar electrocauterization to improve nasal airway and breathing and ameliorate dyspnea, unless symptoms resolve with medical treatment. .     2. Patient agreed to pursue a sleep medicine evaluation and probable polysomnography if snoring and sleep apnea symptoms continue after nasal airway surgery. 3. Fluticasone daily for 6 weeks. 4. Allegra 180 mg daily for 6 weeks. 5. Return in about 6 weeks (around 2/14/2022) for recheck/follow-up, and sooner if condition worsens.

## 2022-03-03 ENCOUNTER — OFFICE VISIT (OUTPATIENT)
Dept: ENT CLINIC | Age: 42
End: 2022-03-03
Payer: OTHER GOVERNMENT

## 2022-03-03 VITALS
SYSTOLIC BLOOD PRESSURE: 123 MMHG | DIASTOLIC BLOOD PRESSURE: 80 MMHG | HEART RATE: 95 BPM | OXYGEN SATURATION: 95 % | TEMPERATURE: 97.1 F

## 2022-03-03 DIAGNOSIS — J34.3 HYPERTROPHY OF BOTH INFERIOR NASAL TURBINATES: Chronic | ICD-10-CM

## 2022-03-03 DIAGNOSIS — H65.02 NON-RECURRENT ACUTE SEROUS OTITIS MEDIA OF LEFT EAR: ICD-10-CM

## 2022-03-03 DIAGNOSIS — H74.8X2: Chronic | ICD-10-CM

## 2022-03-03 DIAGNOSIS — H69.83 ETD (EUSTACHIAN TUBE DYSFUNCTION), BILATERAL: ICD-10-CM

## 2022-03-03 DIAGNOSIS — J34.2 NASAL SEPTAL DEVIATION: Chronic | ICD-10-CM

## 2022-03-03 DIAGNOSIS — G47.30 SLEEP APNEA IN ADULT: Primary | Chronic | ICD-10-CM

## 2022-03-03 PROCEDURE — 99214 OFFICE O/P EST MOD 30 MIN: CPT | Performed by: OTOLARYNGOLOGY

## 2022-03-03 ASSESSMENT — ENCOUNTER SYMPTOMS
RHINORRHEA: 0
SINUS PAIN: 0
SORE THROAT: 0

## 2022-03-03 NOTE — PROGRESS NOTES
[facility]       PCP:  Kendra Zamarripa MD      [facility]  Chief Complaint   Patient presents with    Follow-up     Nasal septal deviation       HISTORY OF PRESENT ILLNESS       Mckenna Cannon is a 42 y.o. female here for evaluation and treatment of nasal congestion and septal deviation. She also has suspected sleep apnea. He was seen previously and treated with fexofenadine and fluticasone. She stated that her nose is \"some better. The medications helped. I'm breathing some better through my nose on both sides. \"        REVIEW OF SYSTEMS   Review of Systems   Constitutional: Negative for chills and fever. HENT: Positive for congestion and postnasal drip. Negative for ear discharge, ear pain, hearing loss, rhinorrhea, sinus pain, sore throat and tinnitus. PAST MEDICAL HISTORY    History reviewed. No pertinent past medical history.       Past Surgical History:   Procedure Laterality Date    MYRINGOTOMY Left 2020    LEFT MYRINGOTOMY AND INSERTION OF PRESSURE EQUALIZING TUBE, BALLOON DILATION OF BILATERAL EUSTACHIAN TUBES performed by Andrew Inman MD at [address] N/A 8/15/2019    LEFT MYRINGOTOMY AND INSERTION OF LEFT PRESSURE EQUALIZING TUBE, BIOPSY OF NASOPHARYNX performed by Andrew Inman MD at [address] [place] [address]      Dr. Hays Reap Bilateral 2002    Dr. Nia Mclaughlin:    22 0928   BP: 123/80   Site: Left Upper Arm   Position: Sitting   Cuff Size: Large Adult   Pulse: 95   Temp: 97.1 °F (36.2 °C)   TempSrc: Temporal   SpO2: 95%         ENT Physical Exam  Consititutional  Appearance: patient appears well-developed, well-nourished and well-groomed,  Communication/Voice: communication appropriate for developmental age; vocal quality normal;  Head and Face  Appearance: head appears normal, face appears normal and face appears atraumatic;  Ear  Hearing:  mildly impaired to finger rub bilaterally; Rinne AC > BC to the right; BC > AC to the left; does not lateralize; Auricles: right auricle normal; bilateral auricles normal; left auricle normal;  Ear Canals: bilateral ear canals normal;  Tympanic Membranes: right tympanic membrane abnormal; thickened and dull; left tympanic membrane retracted and with effusion; thickened and dull; retracted pars tensa; with retraction pocket, depth visible, IS-joint pexy and atelectasis; serous effusion present; right pneumatic otoscopy fully mobile;  left pneumatic otoscopy movement with negative pressure; Nose  Internal Nose: nasal septal deviation (moderate leftward) present; deviation is posterior, to the left, with left spur septal deviation is intermediate; right inferior turbinate with hypertrophy;  Oral Cavity/Oropharynx  Lips: normal;  Tongue: normal;  Oral mucosa: normal;  Hard palate: normal;  Soft palate: normal;  Tonsils: normal;  Posterior pharyngeal wall: normal;       There was normal nasal airflow bilaterally. Dae Mast / José Manuel Vazquez / Tej Alegre was seen today for follow-up. Diagnoses and all orders for this visit:    Sleep apnea in adult  -     Sol Trery MD, Sleep Medicine, [facility]    Nasal septal deviation    Hypertrophy of both inferior nasal turbinates    ETD (Eustachian tube dysfunction), bilateral    Non-recurrent acute serous otitis media of left ear    Middle ear atelectasis, left           RECOMMENDATIONS/PLAN      1. Septal reconstruction and inferior turbinate reduction if improvement in nasal airway is indicated for nasal dyspnea and/or sleep apnea management. 2. Return in about 1 month (around 4/3/2022) for recheck/follow-up, and sooner if condition worsens. Patient Instructions   1. Proceed with sleep medicine specialist evaluation for sleep apnea.   2. Continue fluticasone and the OTC antihistamine. EUSTACHIAN TUBE DYSFUNCTION MEASURES    Please do the following to attempt to improve your Eustachian tube dysfunction  and/or to help to resolve the fluid in your middle ear:  1. Auto inflate your ears as instructed ( hold your nose closed, with your mouth closed and blow out as if blowing ear into or out of ears. If not successful, then swallow while doing the auto inflation maneuver and maintaining the pressure) every three hours, while awake, for ten days, and then four times daily, and as needed for ear congestion. 2.  Take one Mucinex-D (red orange box) maximum strength tablet (ONLY IF approved by your PCP) each morning and one Mucinex (blue box) maximum strength tablet each evening, about 12 hours later, daily for the next ten days. (OTC)       3.  Use 0.05% Oxymetazoline (eg. Afrin, Duration, 4-Way) 12 hour decongestant nasal solution, with two sprays in each nostril three times a day for three days, then twice a day for two days, then stop for two days and then repeat the cycle once. 4.  Use fluticasone nasal spray (generic Flonase), 2 sprays in each nostril once daily.

## 2022-03-03 NOTE — PATIENT INSTRUCTIONS
1. Proceed with sleep medicine specialist evaluation for sleep apnea. 2. Continue fluticasone and the OTC antihistamine. EUSTACHIAN TUBE DYSFUNCTION MEASURES    Please do the following to attempt to improve your Eustachian tube dysfunction  and/or to help to resolve the fluid in your middle ear:  1. Auto inflate your ears as instructed ( hold your nose closed, with your mouth closed and blow out as if blowing ear into or out of ears. If not successful, then swallow while doing the auto inflation maneuver and maintaining the pressure) every three hours, while awake, for ten days, and then four times daily, and as needed for ear congestion. 2.  Take one Mucinex-D (red orange box) maximum strength tablet (ONLY IF approved by your PCP) each morning and one Mucinex (blue box) maximum strength tablet each evening, about 12 hours later, daily for the next ten days. (OTC)       3.  Use 0.05% Oxymetazoline (eg. Afrin, Duration, 4-Way) 12 hour decongestant nasal solution, with two sprays in each nostril three times a day for three days, then twice a day for two days, then stop for two days and then repeat the cycle once. 4.  Use fluticasone nasal spray (generic Flonase), 2 sprays in each nostril once daily.

## 2022-03-08 ENCOUNTER — HOSPITAL ENCOUNTER (OUTPATIENT)
Dept: MAMMOGRAPHY | Age: 42
Discharge: HOME | End: 2022-03-08
Payer: OTHER GOVERNMENT

## 2022-03-08 VITALS — HEIGHT: 63 IN | WEIGHT: 206 LBS | BODY MASS INDEX: 36.5 KG/M2

## 2022-03-08 DIAGNOSIS — Z12.31 ENCOUNTER FOR SCREENING MAMMOGRAM FOR BREAST CANCER: ICD-10-CM

## 2022-03-08 PROCEDURE — 77067 SCR MAMMO BI INCL CAD: CPT

## 2022-03-16 ENCOUNTER — OFFICE VISIT (OUTPATIENT)
Dept: PULMONOLOGY | Age: 42
End: 2022-03-16
Payer: OTHER GOVERNMENT

## 2022-03-16 VITALS
WEIGHT: 205 LBS | HEIGHT: 63 IN | DIASTOLIC BLOOD PRESSURE: 88 MMHG | OXYGEN SATURATION: 97 % | BODY MASS INDEX: 36.32 KG/M2 | HEART RATE: 87 BPM | SYSTOLIC BLOOD PRESSURE: 123 MMHG | TEMPERATURE: 98.3 F

## 2022-03-16 DIAGNOSIS — F41.1 GAD (GENERALIZED ANXIETY DISORDER): Chronic | ICD-10-CM

## 2022-03-16 DIAGNOSIS — G47.10 HYPERSOMNIA: Primary | ICD-10-CM

## 2022-03-16 DIAGNOSIS — R06.83 SNORING: ICD-10-CM

## 2022-03-16 DIAGNOSIS — E66.9 NON MORBID OBESITY, UNSPECIFIED OBESITY TYPE: Chronic | ICD-10-CM

## 2022-03-16 DIAGNOSIS — J30.9 ALLERGIC RHINITIS, UNSPECIFIED SEASONALITY, UNSPECIFIED TRIGGER: Chronic | ICD-10-CM

## 2022-03-16 PROCEDURE — 99204 OFFICE O/P NEW MOD 45 MIN: CPT | Performed by: INTERNAL MEDICINE

## 2022-03-16 RX ORDER — DESVENLAFAXINE 100 MG/1
100 TABLET, EXTENDED RELEASE ORAL DAILY
COMMUNITY

## 2022-03-16 RX ORDER — LORATADINE 10 MG/1
TABLET ORAL
COMMUNITY
Start: 2021-09-04 | End: 2022-06-23

## 2022-03-16 RX ORDER — PHENAZOPYRIDINE HYDROCHLORIDE 200 MG/1
TABLET, FILM COATED ORAL
COMMUNITY
Start: 2021-04-22 | End: 2022-06-23

## 2022-03-16 ASSESSMENT — SLEEP AND FATIGUE QUESTIONNAIRES
HOW LIKELY ARE YOU TO NOD OFF OR FALL ASLEEP WHILE SITTING AND READING: 0
HOW LIKELY ARE YOU TO NOD OFF OR FALL ASLEEP WHILE SITTING INACTIVE IN A PUBLIC PLACE: 0
HOW LIKELY ARE YOU TO NOD OFF OR FALL ASLEEP WHILE LYING DOWN TO REST IN THE AFTERNOON WHEN CIRCUMSTANCES PERMIT: 2
HOW LIKELY ARE YOU TO NOD OFF OR FALL ASLEEP WHILE SITTING QUIETLY AFTER LUNCH WITHOUT ALCOHOL: 0
NECK CIRCUMFERENCE (INCHES): 12.5
HOW LIKELY ARE YOU TO NOD OFF OR FALL ASLEEP IN A CAR, WHILE STOPPED FOR A FEW MINUTES IN TRAFFIC: 0
HOW LIKELY ARE YOU TO NOD OFF OR FALL ASLEEP WHEN YOU ARE A PASSENGER IN A CAR FOR AN HOUR WITHOUT A BREAK: 0
HOW LIKELY ARE YOU TO NOD OFF OR FALL ASLEEP WHILE WATCHING TV: 0
HOW LIKELY ARE YOU TO NOD OFF OR FALL ASLEEP WHILE SITTING AND TALKING TO SOMEONE: 0
ESS TOTAL SCORE: 2

## 2022-03-16 NOTE — LETTER
[facility] Sleep Medicine  [place] [address] [place] [address]  Phone: [phone]  Fax: [phone]           Lisa Lee MD      March 16, 2022     Patient: Don Duarte   MR Number: [id number]   YOB: 1980   Date of Visit: 3/16/2022       Dear Dr. Wen Mathur:    Thank you for referring Jaleesa Arreola to me for evaluation/treatment. Below are the relevant portions of my assessment and plan of care. Visit Diagnoses and Associated Orders     Hypersomnia   (New Problem)  -  Primary    needs work-up    POLYSOMNOGRAPHY (PSG) - Diagnostic Testing [94537 Custom]   - Future Order         Snoring   (New Problem)      needs work-up    POLYSOMNOGRAPHY (PSG) - Diagnostic Testing [33113 Custom]   - Future Order         Allergic rhinitis, unspecified seasonality, unspecified trigger   (Stable)           OLEKSANDR (generalized anxiety disorder)   (Stable)           Non morbid obesity, unspecified obesity type   (Not Stable)           ORDERS WITHOUT AN ASSOCIATED DIAGNOSIS    loratadine (CLARITIN) 10 MG tablet [67276]      phenazopyridine (PYRIDIUM) 200 MG tablet [6194]      desvenlafaxine succinate (PRISTIQ) 100 MG TB24 extended release tablet [74727]            One or more undiagnosed new problem with uncertain prognosis till final diagnosis is made. Differential diagnosis includes but not limited to: CASSANDRA, PLMD's, narcolepsy, parasomnias. Reviewed CASSANDRA (which is the highest likelihood diagnosis): pathophysiology, diagnosis, complications and treatment. Instructed her not to drive if drowsy. Continue medications per her PCP and other physicians. Limit caffeine use after 3pm. Will do PSG to rule-out CASSANDRA and other sleep disorders. 1 wk follow up after study to review her results. The chronic medical conditions listed are directly related to the primary diagnosis listed above. The management of the primary diagnosis affects the secondary diagnosis and vice versa.     Reviewed referral from patient's ENT dated 3/3/2022 showing the patient at risk for sleep apnea. This information was analyzed to assess complexity and medical decision making in regards to further testing and management. Continue meds for: OLEKSANDR. Pt would medically benefit from wt loss for CASSANDRA (diet, exercise, surgical). Orders Placed This Encounter   Procedures    POLYSOMNOGRAPHY (PSG) - Diagnostic Testing       If you have questions, please do not hesitate to call me. I look forward to following Herbert Eddy along with you.     Sincerely,        Mello Curm MD    CC providers:  Merian Mortimer, MD  [address] [place] [id number]  Via In [address] [place], [place] [address]y [address] Corry TRONCOSO MD  [facility] [address]  Via Fax: [phone]

## 2022-03-16 NOTE — PROGRESS NOTES
Monica Kwan MD, [facility], [facility]  [address]  [address] [facility], [address] [phone]   [facility] [address] True Perez. [address] [place] [address]. Kathy [address] 37 [phone]     [facility] [address] [place] [address]  Dept: [phone]  Loc: [phone]    Assessment:      Visit Diagnoses and Associated Orders     Hypersomnia   (New Problem)  -  Primary    needs work-up    POLYSOMNOGRAPHY (PSG) - Diagnostic Testing [78326 Custom]   - Future Order         Snoring   (New Problem)      needs work-up    POLYSOMNOGRAPHY (PSG) - Diagnostic Testing [52893 Custom]   - Future Order         Allergic rhinitis, unspecified seasonality, unspecified trigger   (Stable)           OLEKSANDR (generalized anxiety disorder)   (Stable)           Non morbid obesity, unspecified obesity type   (Not Stable)           ORDERS WITHOUT AN ASSOCIATED DIAGNOSIS    loratadine (CLARITIN) 10 MG tablet [78244]      phenazopyridine (PYRIDIUM) 200 MG tablet [2994]      desvenlafaxine succinate (PRISTIQ) 100 MG TB24 extended release tablet [[id number]]             Plan:      One or more undiagnosed new problem with uncertain prognosis till final diagnosis is made. Differential diagnosis includes but not limited to: CASSANDRA, PLMD's, narcolepsy, parasomnias. Reviewed CASSANDRA (which is the highest likelihood diagnosis): pathophysiology, diagnosis, complications and treatment. Instructed her not to drive if drowsy. Continue medications per her PCP and other physicians. Limit caffeine use after 3pm. Will do PSG to rule-out CASSANDRA and other sleep disorders. 1 wk follow up after study to review her results. The chronic medical conditions listed are directly related to the primary diagnosis listed above. The management of the primary diagnosis affects the secondary diagnosis and vice versa.     Reviewed referral from patient's ENT dated 3/3/2022 showing the patient at risk for sleep apnea. This information was analyzed to assess complexity and medical decision making in regards to further testing and management. Continue meds for: OLEKSANDR. Pt would medically benefit from wt loss for CASSANDRA (diet, exercise, surgical). Orders Placed This Encounter   Procedures    POLYSOMNOGRAPHY (PSG) - Diagnostic Testing          Subjective:     Patient ID: Mckenna Cannon is a 42 y.o. female. Chief Complaint   Patient presents with    Daytime Sleepiness    Sleep Apnea       HPI:      Mckenna Cannon is a 42 y.o. female referred by Jodee Akbar MD for a sleep evaluation. She complains of: snoring, excessive daytime sleepiness , non-restorative sleep, nocturia, decreased concentration and tossing and turning at night. She denies: cataplexy and hypnagogic hallucinations. Symptoms began several years ago, gradually worsening since that time. Previous evaluation and treatment has included- none    DOT/CDL - No  FAA/'s license -No    Previous Report(s) Reviewed: historical medical records, office notes, andreferral letter(s). Pertinent data has been documented.     [place] - Total score: 2    Caffeine Intake - Pop/Soda: 1 can(s) per day    Social History     Socioeconomic History    Marital status:      Spouse name: Not on file    Number of children: Not on file    Years of education: Not on file    Highest education level: Not on file   Occupational History    Not on file   Tobacco Use    Smoking status: Never Smoker    Smokeless tobacco: Never Used   Vaping Use    Vaping Use: Never used   Substance and Sexual Activity    Alcohol use: No    Drug use: No    Sexual activity: Yes     Partners: Male   Other Topics Concern    Not on file   Social History Narrative    Not on file     Social Determinants of Health     Financial Resource Strain:     Difficulty of Paying Living Expenses: Not on file   Food Insecurity:     Worried About Running Out of Food in the Last Year: Not on file    Ran Out of Food in the Last Year: Not on file   Transportation Needs:     Lack of Transportation (Medical): Not on file    Lack of Transportation (Non-Medical): Not on file   Physical Activity:     Days of Exercise per Week: Not on file    Minutes of Exercise per Session: Not on file   Stress:     Feeling of Stress : Not on file   Social Connections:     Frequency of Communication with Friends and Family: Not on file    Frequency of Social Gatherings with Friends and Family: Not on file    Attends Hindu Services: Not on file    Active Member of [address] [place] [address] or Organizations: Not on file    Attends Club or Organization Meetings: Not on file    Marital Status: Not on file   Intimate Partner Violence:     Fear of Current or Ex-Partner: Not on file    Emotionally Abused: Not on file    Physically Abused: Not on file    Sexually Abused: Not on file   Housing Stability:     Unable to Pay for Housing in the Last Year: Not on file    Number of [place] in the Last Year: Not on file    Unstable Housing in the Last Year: Not on file        Current Outpatient Medications   Medication Instructions    desvenlafaxine succinate (PRISTIQ) 100 mg, Oral, DAILY    Etonogestrel-Ethinyl Estradiol (NUVARING VA) Vaginal    fluticasone (FLONASE) 50 MCG/ACT nasal spray 2 sprays in each nostril daily.  loratadine (CLARITIN) 10 MG tablet No dose, route, or frequency recorded.  Multiple Vitamins-Minerals (THERAPEUTIC MULTIVITAMIN-MINERALS) tablet 1 tablet, Oral, DAILY    phenazopyridine (PYRIDIUM) 200 MG tablet No dose, route, or frequency recorded. Objective:     Vitals:  Weight BMI   Wt Readings from Last 3 Encounters:   03/16/22 205 lb (93 kg)   03/08/22 206 lb (93.4 kg)   09/18/20 181 lb 4 oz (82.2 kg)    Body mass index is 36.31 kg/m².      BP HR SaO2   BP Readings from Last 3 Encounters:   03/16/22 123/88   03/03/22 123/80   01/03/22 127/85    Pulse Readings from Last 3 Encounters:   03/16/22 87   03/03/22 95   01/03/22 88    SpO2 Readings from Last 3 Encounters:   03/16/22 97%   03/03/22 95%   09/18/20 100%        Physical Exam  Vitals reviewed. Constitutional:       General: She is not in acute distress. Appearance: Normal appearance. She is well-developed. She is not toxic-appearing or diaphoretic. HENT:      Head: Normocephalic and atraumatic. Not macrocephalic and not microcephalic. Right Ear: External ear normal.      Left Ear: External ear normal.      Nose: Septal deviation and mucosal edema present. No nasal deformity. Mouth/Throat:      Lips: Pink. Mouth: Mucous membranes are moist.      Tongue: No lesions. Palate: No mass. Pharynx: Uvula midline. Uvula swelling present. No oropharyngeal exudate. Tonsils: No tonsillar exudate or tonsillar abscesses. Comments: Tonsils: normal size  Eyes:      General: Lids are normal.      Extraocular Movements: Extraocular movements intact. Conjunctiva/sclera: Conjunctivae normal.      Pupils: Pupils are equal, round, and reactive to light. Neck:      Vascular: No JVD. Trachea: Trachea normal.      Comments: Neck Circ: 12.5 inches    Cardiovascular:      Rate and Rhythm: Normal rate and regular rhythm. Heart sounds: Normal heart sounds. Pulmonary:      Effort: Pulmonary effort is normal.      Breath sounds: Normal breath sounds. Abdominal:      General: Bowel sounds are normal.   Musculoskeletal:      Cervical back: Normal range of motion. Comments: No evidence of cyanosis or clubbing of nails   Skin:     General: Skin is warm. Nails: There is no clubbing. Neurological:      General: No focal deficit present. Mental Status: She is alert. Psychiatric:         Attention and Perception: Attention normal.         Mood and Affect: Mood and affect normal.         Speech: Speech normal.         Behavior: Behavior normal. Behavior is cooperative. Thought Content:  Thought content normal.         Electronically signed by Tonya Koenig MD on3/16/2022 at 3:07 PM

## 2022-03-17 ENCOUNTER — TELEPHONE (OUTPATIENT)
Dept: SLEEP CENTER | Age: 42
End: 2022-03-17

## 2022-03-17 NOTE — TELEPHONE ENCOUNTER
Called to schedule a psg per Nathaly Sarmiento vm for the pt to return my call      insurance  Not sure about shots

## 2022-04-10 ENCOUNTER — HOSPITAL ENCOUNTER (OUTPATIENT)
Dept: SLEEP CENTER | Age: 42
Discharge: HOME | End: 2022-04-10
Payer: OTHER GOVERNMENT

## 2022-04-10 DIAGNOSIS — R06.83 SNORING: ICD-10-CM

## 2022-04-10 DIAGNOSIS — G47.10 HYPERSOMNIA: ICD-10-CM

## 2022-04-10 PROCEDURE — 95810 POLYSOM 6/> YRS 4/> PARAM: CPT

## 2022-04-11 PROCEDURE — 95810 POLYSOM 6/> YRS 4/> PARAM: CPT | Performed by: INTERNAL MEDICINE

## 2022-04-14 ENCOUNTER — OFFICE VISIT (OUTPATIENT)
Dept: ENT CLINIC | Age: 42
End: 2022-04-14
Payer: OTHER GOVERNMENT

## 2022-04-14 VITALS
DIASTOLIC BLOOD PRESSURE: 80 MMHG | HEART RATE: 90 BPM | OXYGEN SATURATION: 95 % | SYSTOLIC BLOOD PRESSURE: 119 MMHG | TEMPERATURE: 97.1 F

## 2022-04-14 DIAGNOSIS — H74.8X2: Chronic | ICD-10-CM

## 2022-04-14 DIAGNOSIS — H69.83 ETD (EUSTACHIAN TUBE DYSFUNCTION), BILATERAL: Chronic | ICD-10-CM

## 2022-04-14 DIAGNOSIS — J34.3 HYPERTROPHY OF BOTH INFERIOR NASAL TURBINATES: Chronic | ICD-10-CM

## 2022-04-14 DIAGNOSIS — J34.89 NASAL OBSTRUCTION: ICD-10-CM

## 2022-04-14 DIAGNOSIS — J34.2 NASAL SEPTAL DEVIATION: Primary | Chronic | ICD-10-CM

## 2022-04-14 DIAGNOSIS — H65.02 NON-RECURRENT ACUTE SEROUS OTITIS MEDIA OF LEFT EAR: Chronic | ICD-10-CM

## 2022-04-14 DIAGNOSIS — J30.1 SEASONAL ALLERGIC RHINITIS DUE TO POLLEN: Chronic | ICD-10-CM

## 2022-04-14 PROCEDURE — 99214 OFFICE O/P EST MOD 30 MIN: CPT | Performed by: OTOLARYNGOLOGY

## 2022-04-14 NOTE — PROGRESS NOTES
[facility]         PCP:  Tritsan Robins MD      CHIEF COMPLAINT  Chief Complaint   Patient presents with    Ear Problem    Nose Problem       HISTORY OF PRESENT ILLNESS       Christine Blevins is a 42 y.o. female here for recheck and follow up of nose and ear problem. Nose is \"doing all right. \" Breathing well through both sides. Still using Flonase and allegra. Ears are better. Past week feel more pressure in ears this past week. Was off and on prior and able to autioinflate and relieve pressure feeling. Saw Dr. Cuadra Staff, sleep medicine. Sleep study done 4/10/22. PAST MEDICAL HISTORY    Past Medical History:   Diagnosis Date    Allergic rhinitis 3/16/2022    OLEKSANDR (generalized anxiety disorder) 3/16/2022    Non morbid obesity, unspecified obesity type 3/16/2022       Past Surgical History:   Procedure Laterality Date    MYRINGOTOMY Left 9/18/2020    LEFT MYRINGOTOMY AND INSERTION OF PRESSURE EQUALIZING TUBE, BALLOON DILATION OF BILATERAL EUSTACHIAN TUBES performed by Mookie Dunaway MD at [address] [facility] TYMPANOSTOMY TUBE PLACEMENT N/A 8/15/2019    LEFT MYRINGOTOMY AND INSERTION OF LEFT PRESSURE EQUALIZING TUBE, BIOPSY OF NASOPHARYNX performed by Mookie Dunaway MD at [address] [place] [address]  2002    Dr. Marek Rhodes Bilateral 2002    Dr. Leach Fast:    04/14/22 0902   BP: 119/80   Site: Left Upper Arm   Position: Sitting   Cuff Size: Large Adult   Pulse: 90   Temp: 97.1 °F (36.2 °C)   TempSrc: Temporal   SpO2: 95%       General:  WDWN, NAD, alert and oriented  Face: There was no swelling or lesions detected. Voice: Normal with no hoarseness or hot potato voice. Ears:  Binaural binocular otomicroscopy performed.     AD TM dull, thick, retracted onto IS joint which appeared intact and TM did elevate completely with autoinfl but then retracts again.  Normal pneumatic mobility. AS TM dull thickened and retracted onto IS joint but does elevate completely with autoinflaltion but then retracts again. ONI noted. Negative ME pressure on pneumatic massage. Nose: The nasal septum was moderately to the left in the posterior half with a left inferior spur. The right inferior nasal turbinate appeared to be enlarged. The nasal secretions and mucosa appeared to be normal.   Sinuses:  Maxillary and frontal sinuses were nontender to palpation and percussion. Oral cavity:  Mucosa, secretions, tongue, and gingiva appeared to be normal.   Oropharynx:  The palatine tonsils, hard and soft palates, uvula, tongue, posterior oropharyngeal wall, mucosa and secretions appeared to be normal.     Salivary Glands:  Normal bilateral parotid and bilateral submandibular salivary glands. Neck:  No masses or tenderness. Trachea midline. Laryngeal cartilages and hyoid bone normal.    Thyroid:  Normal, nontender, no goiter or nodules palpable. Lymph nodes: No cervical lymphadenopathy. Chance Smith / Vineet Scott / Althea Thomas was seen today for ear problem and nose problem. Diagnoses and all orders for this visit:    Nasal septal deviation    Hypertrophy of both inferior nasal turbinates    ETD (Eustachian tube dysfunction), bilateral    Non-recurrent acute serous otitis media of left ear    Middle ear atelectasis, left    Nasal obstruction    Seasonal allergic rhinitis due to pollen         RECOMMENDATIONS/PLAN      1. Options continue meds and ET measures. Versus tympantomy tubes. 2. Septal reconstruction and turbinate reduction at least on the right side, especially if being treated with nasal CPAP, versus continued medication for nose. 3. Return in about 2 months (around 6/14/2022) for recheck/follow-up, and sooner if condition worsens. Patient Instructions   1. Proceed with sleep medicine treatment as indicated.     2. Continue fluticasone and fexofenadine. EUSTACHIAN TUBE DYSFUNCTION MEASURES  Please do the following to attempt to improve your Eustachian tube dysfunction  and/or to help to resolve the fluid in your middle ear:  1. Auto inflate your ears as instructed ( hold your nose closed, with your mouth closed and blow out as if blowing ear into or out of ears. If not successful, then swallow while doing the auto inflation maneuver and maintaining the pressure) four times daily and as needed for ear congestion or pressure. 2.  Take one Mucinex-D (red orange box) maximum strength tablet (ONLY IF approved by your PCP) each morning and one Mucinex (blue box) maximum strength tablet each evening, about 12 hours later, daily for the next 14 days. (OTC)       3.  Use 0.05% Oxymetazoline (eg. Afrin, Duration, 4-Way) 12 hour decongestant nasal solution, with two sprays in each nostril three times a day for three days, then twice a day for two days, then stop for two days and then repeat the cycle once. 4.  Use fluticasone nasal spray (generic Flonase), 2 sprays in each nostril once daily.

## 2022-04-14 NOTE — PATIENT INSTRUCTIONS
1. Proceed with sleep medicine treatment as indicated. 2. Continue fluticasone and fexofenadine. EUSTACHIAN TUBE DYSFUNCTION MEASURES  Please do the following to attempt to improve your Eustachian tube dysfunction  and/or to help to resolve the fluid in your middle ear:  1. Auto inflate your ears as instructed ( hold your nose closed, with your mouth closed and blow out as if blowing ear into or out of ears. If not successful, then swallow while doing the auto inflation maneuver and maintaining the pressure) four times daily and as needed for ear congestion or pressure. 2.  Take one Mucinex-D (red orange box) maximum strength tablet (ONLY IF approved by your PCP) each morning and one Mucinex (blue box) maximum strength tablet each evening, about 12 hours later, daily for the next 14 days. (OTC)       3.  Use 0.05% Oxymetazoline (eg. Afrin, Duration, 4-Way) 12 hour decongestant nasal solution, with two sprays in each nostril three times a day for three days, then twice a day for two days, then stop for two days and then repeat the cycle once. 4.  Use fluticasone nasal spray (generic Flonase), 2 sprays in each nostril once daily.

## 2022-04-22 ENCOUNTER — TELEPHONE (OUTPATIENT)
Dept: ENT CLINIC | Age: 42
End: 2022-04-22

## 2022-04-22 ENCOUNTER — TELEPHONE (OUTPATIENT)
Dept: PULMONOLOGY | Age: 42
End: 2022-04-22

## 2022-04-22 DIAGNOSIS — G47.33 OBSTRUCTIVE SLEEP APNEA (ADULT) (PEDIATRIC): Primary | ICD-10-CM

## 2022-04-22 NOTE — TELEPHONE ENCOUNTER
Pt.states she was told to inform  when her sleep study results came back so he can look at them they have came back.

## 2022-04-22 NOTE — TELEPHONE ENCOUNTER
Please call patient advise her that I reviewed the sleep study and I will discuss this further with her at the follow-up visit in June.

## 2022-05-22 ENCOUNTER — HOSPITAL ENCOUNTER (OUTPATIENT)
Dept: SLEEP CENTER | Age: 42
Discharge: HOME | End: 2022-05-22
Payer: OTHER GOVERNMENT

## 2022-05-22 DIAGNOSIS — G47.33 OBSTRUCTIVE SLEEP APNEA (ADULT) (PEDIATRIC): ICD-10-CM

## 2022-05-22 DIAGNOSIS — G47.33 OBSTRUCTIVE SLEEP APNEA SYNDROME: ICD-10-CM

## 2022-05-22 PROCEDURE — 95811 POLYSOM 6/>YRS CPAP 4/> PARM: CPT

## 2022-05-27 ENCOUNTER — TELEPHONE (OUTPATIENT)
Dept: PULMONOLOGY | Age: 42
End: 2022-05-27

## 2022-05-27 DIAGNOSIS — G47.33 OBSTRUCTIVE SLEEP APNEA SYNDROME: Primary | ICD-10-CM

## 2022-05-27 PROCEDURE — 95811 POLYSOM 6/>YRS CPAP 4/> PARM: CPT | Performed by: INTERNAL MEDICINE

## 2022-05-27 NOTE — TELEPHONE ENCOUNTER
Patient left a message stating that since she had her titration study earlier in the week she is still having pressure in her ears and wanted to know if there is anything else she can do. Please call patient at [phone].

## 2022-05-31 ENCOUNTER — TELEPHONE (OUTPATIENT)
Dept: PULMONOLOGY | Age: 42
End: 2022-05-31

## 2022-05-31 NOTE — PROGRESS NOTES
Jennifer Isaac         : 1980  [facility]    Diagnosis: [x] CASSANDRA (G47.33) [] CSA (G47.31) [] Apnea (G47.30)   Length of Need: [x] 18 Months [] 99 Months [] Other:    Machine (REESE!): [] Respironics Dream Station   2   Auto [x] ResMed [employer]  [] Other:     []  CPAP () [x] Bilevel ()   Mode: [] Auto [] Spontaneous    Mode: [x] Auto [] Spontaneous       IPAP max 25 cmH2O  EPAP ,om 10 cmH2O  PS 3 cmH2O     Comfort Settings:   - Ramp Pressure: 5 cmH2O                                        - Ramp time: 15 min                                     -  Flex/EPR - 3 full time                                    - For ResMed Bilevel (TiMax-4 sec   TiMin- 0.2 sec)     Humidifier: [x] Heated ()        [x] Water chamber replacement ()/ 1 per 6 months        Mask:   [] Nasal () /1 per 3 months [x] Full Face () /1 per 3 months   [] Patient choice -Size and fit mask [x] Patient Choice - Size and fit mask   [] Dispense:  [x] Dispense: Airfit F30i   [] Headgear () / 1 per 3 months [x] Headgear () / 1 per 3 months   [] Replacement Nasal Cushion ()/2 per month [] Interface Replacement ()/1 per month   [] Replacement Nasal Pillows ()/2 per month         Tubing: [x] Heated ()/1 per 3 months    [] Standard ()/1 per 3 months [] Other:           Filters: [x] Non-disposable ()/1 per 6 months     [x] Ultra-Fine, Disposable ()/2 per month        Miscellaneous: [] Chin Strap ()/ 1 per 6 months [] O2 bleed-in:       LPM   [] Oximetry on CPAP/Bilevel []  Other:    [x] Modem: ()         Start Order Date: 22    MEDICAL JUSTIFICATION:  I, the undersigned, certify that the above prescribed supplies are medically necessary for this patients wellbeing. In my opinion, the supplies are both reasonable and necessary in reference to accepted standards of medicalpractice in treatment of this patients condition.     Yandel Lr MD      NPI: [id number] Order Signed Date: 22    Electronically signed by Rosa Abraham MD on 2022 at 2:54 PM    Deepa Garcia  1980  [address] [place] [address]  [phone] (home)   [phone] (mobile)      Insurance Info (confirm with patient if correct):  Payor/Plan Subscr  Sex Relation Sub.  Ins. ID Effective Group Num

## 2022-05-31 NOTE — TELEPHONE ENCOUNTER
Spoke with pt to review titration study. Order to be sent to [facility]. F/U scheduled.

## 2022-06-01 NOTE — TELEPHONE ENCOUNTER
Was just doing over-the-counter nasal steroid spray like Flonase or Nasacort, she can do 2 sprays each nostril once a day and if her symptoms not improve after few weeks she should check with her primary care doctor

## 2022-06-01 NOTE — TELEPHONE ENCOUNTER
Spoke with patient and she already takes a nasal spray and allergy medication.   She said it has gotten better and will contact PCP if symptoms persist.

## 2022-06-15 ENCOUNTER — OFFICE VISIT (OUTPATIENT)
Dept: ENT CLINIC | Age: 42
End: 2022-06-15
Payer: OTHER GOVERNMENT

## 2022-06-15 VITALS
DIASTOLIC BLOOD PRESSURE: 79 MMHG | HEIGHT: 63 IN | RESPIRATION RATE: 18 BRPM | TEMPERATURE: 97.7 F | WEIGHT: 211 LBS | SYSTOLIC BLOOD PRESSURE: 110 MMHG | HEART RATE: 91 BPM | BODY MASS INDEX: 37.39 KG/M2 | OXYGEN SATURATION: 97 %

## 2022-06-15 DIAGNOSIS — H74.8X2: Chronic | ICD-10-CM

## 2022-06-15 DIAGNOSIS — H90.12 CONDUCTIVE HEARING LOSS OF LEFT EAR WITH UNRESTRICTED HEARING OF RIGHT EAR: Chronic | ICD-10-CM

## 2022-06-15 DIAGNOSIS — H69.83 ETD (EUSTACHIAN TUBE DYSFUNCTION), BILATERAL: Primary | Chronic | ICD-10-CM

## 2022-06-15 PROCEDURE — 99213 OFFICE O/P EST LOW 20 MIN: CPT | Performed by: OTOLARYNGOLOGY

## 2022-06-15 RX ORDER — FEXOFENADINE HYDROCHLORIDE 60 MG/1
60 TABLET, FILM COATED ORAL DAILY
COMMUNITY

## 2022-06-15 NOTE — PROGRESS NOTES
[facility]       PCP:  Amos Jenkins MD      CHIEF COMPLAINT  Chief Complaint   Patient presents with    Otitis Media     eustachian tube dysfunction       HISTORY OF PRESENT ILLNESS       Moises Blanca is a 36 y.o. female here for recheck and follow up of bilateral serous otitis media and eustachian tube dysfunction, with middle ear atelectasis. PAST MEDICAL HISTORY    Past Medical History:   Diagnosis Date    Allergic rhinitis 3/16/2022    OLEKSANDR (generalized anxiety disorder) 3/16/2022    Non morbid obesity, unspecified obesity type 3/16/2022       Past Surgical History:   Procedure Laterality Date    MYRINGOTOMY Left 9/18/2020    LEFT MYRINGOTOMY AND INSERTION OF PRESSURE EQUALIZING TUBE, BALLOON DILATION OF BILATERAL EUSTACHIAN TUBES performed by Adama Reyes MD at [address] [facility] TYMPANOSTOMY TUBE PLACEMENT N/A 8/15/2019    LEFT MYRINGOTOMY AND INSERTION OF LEFT PRESSURE EQUALIZING TUBE, BIOPSY OF NASOPHARYNX performed by Adama Reyes MD at [address] [place] [address]  2002    Dr. Jairo Lewis Bilateral 2002    Dr. Lizama Piper:    06/15/22 0933   BP: 110/79   Pulse: 91   Resp: 18   Temp: 97.7 °F (36.5 °C)   SpO2: 97%   Weight: 211 lb (95.7 kg)   Height: 5' 3\" (1.6 m)     General:  WDWN, NAD, alert and oriented  Face: There was no swelling or lesions detected. Voice: Normal with no hoarseness or hot potato voice. Ears:  TheTMs were retracted left > right onto I-S joint and left promontory, but elevated completely on both sides off of the I-S joints and promontory, but immediately retracted again. The I-S joint and incus and stapes appear to be intact. Non-erythematous no middle ear effusion. The EACs appeared to be normal. Binaural binocular otomicroscopy performed.       HEARING ASSESSMENT:    Finger rub:  Able to hear finger rub bilaterally. Tuning fork tests, [address] tuning fork:  Aldridge test was heard in the left ear. Rinne air > bone on the right and bone > air on the left. Nose: The nasal septum was deviated to the left with decreased left airflow. The nasal turbinates, secretions, and mucosa appeared to be normal.   Sinuses:  Maxillary and frontal sinuses were nontender to palpation and percussion. Oral cavity:  Mucosa, secretions, tongue, and gingiva appeared to be normal.   Oropharynx:  The palatine tonsils, hard and soft palates, uvula, tongue, posterior oropharyngeal wall, mucosa and secretions appeared to be normal.     Salivary Glands:  Normal bilateral parotid and bilateral submandibular salivary glands. Neck:  No masses or tenderness. Trachea midline. Laryngeal cartilages and hyoid bone normal.    Lymph nodes:  No cervical lymphadenopathy. COUNSELING:  Zeo Bonner was counseled for the procedure of bilateral myringotomy and insertion of tympanostomy tubes (PE tubes). Zoe Bonner stated that the desire to proceed with the surgery. The surgical procedure was described. I discussed the tympanic membrane incision. I advised of the possibility of permanent hole in the ear drum after the tube extrudes or is removed in the future. I advised of the possibility of persistent or increased hearing loss. I discussed persistent disease, middle ear effusion, and recurrent ear infections. I advised of 20% incidence of otorrhea and/or ear infection while tubes are in placed. I advised of need for strict water precautions. I discussed early extrusion of the tubes. I discussed scarring and/or thinning of the ear drum. I advised of potential complication of anesthesia, including but not limited to cardiac arrest, heart attack, stroke, adverse reaction to medications, and death. I discussed the surgical technique and the usual post operative course.   I discussed the alternatives of therapy, expected outcome and potential benefits, and the attendant risks and potential complications, per the informed consent document, which was discussed together. Thiago Stewart then asked appropriate questions, all of which were answered. Thiago Stewart then stated that she understands and accepts the preceding, has no further questions and desires to proceed with surgery. Then, the informed consent document read and signed by Thiago Stewart. John Common / Ed Encinas / Nancy Edouard was seen today for otitis media. Diagnoses and all orders for this visit:    ETD (Eustachian tube dysfunction), bilateral  -     Cris Menjivar [place], [place]. JEREMIAH, Audiology, [facility]    Middle ear atelectasis, left  -     [facility] [place]. JEREMIAH, Audiology, [facility]    Conductive hearing loss of left ear with unrestricted hearing of right ear  -     [address] [place], [place]. BRITTANY., Audiology, [facility]         RECOMMENDATIONS/PLAN      1. Audiogram.  2. Bilateral butterfly tubes, patient desires in OR under general anesthesia. 3. Return for post op check.

## 2022-06-23 ENCOUNTER — PROCEDURE VISIT (OUTPATIENT)
Dept: AUDIOLOGY | Age: 42
End: 2022-06-23
Payer: OTHER GOVERNMENT

## 2022-06-23 DIAGNOSIS — H90.A12 CONDUCTIVE HEARING LOSS OF LEFT EAR WITH RESTRICTED HEARING OF RIGHT EAR: ICD-10-CM

## 2022-06-23 DIAGNOSIS — H69.93 TYPE C TYMPANOGRAM OF BOTH EARS: Primary | ICD-10-CM

## 2022-06-23 PROCEDURE — 92567 TYMPANOMETRY: CPT | Performed by: AUDIOLOGIST

## 2022-06-23 PROCEDURE — 92557 COMPREHENSIVE HEARING TEST: CPT | Performed by: AUDIOLOGIST

## 2022-06-23 NOTE — PROGRESS NOTES
Name_______________________________________Printed:____________________  Date and time of surgery___6/28/2022_____0730________________Arrival Time:_0600  [facility]_______________   1. The instructions given regarding when and if a patient needs to stop oral intake prior to surgery varies. Follow the specific instructions you were given                  _x__Nothing to eat or to drink after Midnight the night before.                   ____Carbo loading or ERAS instructions will be given to select patients-if you have been given those instructions -please do the following                           The evening before your surgery after dinner before midnight drink 40 ounces of gatorade. If you are diabetic use sugar free. The morning of surgery drink 40 ounces of water. This needs to be finished 3 hours prior to your surgery start time. 2. Take the following pills with a small sip of water on the morning of surgery___none________________________________________________                  Do not take blood pressure medications ending in pril or sartan the mariposa prior to surgery or the morning of surgery_   3. Aspirin, Ibuprofen, Advil, Naproxen, Vitamin E and other Anti-inflammatory products and supplements should be stopped for 5 -7days before surgery or as directed by your physician. 4. Check with your Doctor regarding stopping Plavix, Coumadin,Eliquis, Lovenox,Effient,Pradaxa,Xarelto, Fragmin or other blood thinners and follow their instructions. 5. Do not smoke, and do not drink any alcoholic beverages 24 hours prior to surgery. This includes NA Beer. Refrain from the usage of any recreational drugs. 6. You may brush your teeth and gargle the morning of surgery. DO NOT SWALLOW WATER   7. You MUST make arrangements for a responsible adult to stay on site while you are here and take you home after your surgery. You will not be allowed to leave alone or drive yourself home.   It is strongly suggested someone stay with you the first 24 hrs. Your surgery will be cancelled if you do not have a ride home. 8. A parent/legal guardian must accompany a child scheduled for surgery and plan to stay at the hospital until the child is discharged. Please do not bring other children with you. 9. Please wear simple, loose fitting clothing to the hospital.  Anisha Him not bring valuables (money, credit cards, checkbooks, etc.) Do not wear any makeup (including no eye makeup) or nail polish on your fingers or toes. 10. DO NOT wear any jewelry or piercings on day of surgery. All body piercing jewelry must be removed. 11. If you have ___dentures, they will be removed before going to the OR; we will provide you a container. If you wear ___contact lenses or ___glasses, they will be removed; please bring a case for them. 12. Please see your family doctor/pediatrician for a history & physical and/or concerning medications. Bring any test results/reports from your physician's office. PCP__________________Phone___________H&P Appt. Date________             13 If you  have a Living Will and Durable Power of  for Healthcare, please bring in a copy. 15. Notify your Surgeon if you develop any illness between now and surgery  time, cough, cold, fever, sore throat, nausea, vomiting, etc.  Please notify your surgeon if you experience dizziness, shortness of breath or blurred vision between now & the time of your surgery             15. DO NOT shave your operative site 96 hours prior to surgery. For face & neck surgery, men may use an electric razor 48 hours prior to surgery. 16. Shower the night before or morning of surgery using an antibacterial soap or as you have been instructed. 17. To provide excellent care visitors will be limited to one in the room at any given time. 18.  Please bring picture ID and insurance card.              19.  Visit our web site for additional information:  [email or web address]              20.During flu season no children under the age of 15 are permitted in the hospital for the safety of all patients. 21. If you take a long acting insulin in the evening only  take half of your usual  dose the night  before your procedure              22. If you use a c-pap please bring DOS if staying overnight,             23.For your convenience [facility] has a pharmacy on site to fill your prescriptions. 24. If you use oxygen and have a portable tank please bring it  with you the DOS             25. Bring a complete list of all your medications with name and dose include any supplements. 26. Other__________________________________________   *Please call pre admission testing if you any further questions   Jean Amezquita   [address]    [place] [id number]. [facility]  [phone]   [address] [place] [address]       VISITOR POLICY(subject to change)    Current policy is 2 visitors per patient. No children. A mask is required. Visiting hours are 8a-8p. Overnight visitors will be at the discretion of the nurse. All above information reviewed with patient in person or by phone. Patient verbalizes understanding. All questions and concerns addressed.                                                                                                  Patient/Rep_patient___________________                                                                                                                                    PRE OP INSTRUCTIONS

## 2022-06-23 NOTE — PROGRESS NOTES
[facility]  Division of Audiology/Otolaryngology    6/23/2022     Patient name: Danya Peabody  Primary Care Physician: Krupa Kuhn MD   Medical Record Number: [id number]     Danya Peabody   1980, 36 y.o. female   [id number]       Referring Provider: Brody Warren MD  Referral Type: In an order in [address] [place] [address]    Reason for Visit: Evaluation of the cause of disorders of hearing, tinnitus, or balance. ADULT AUDIOLOGIC EVALUATION                    Danya Peabody is a 36 y.o. female seen today, 6/23/2022 , for audiologic evaluation. Patient was seen by Brody Wraren MD prior to today's evaluation. AUDIOLOGIC AND OTHER PERTINENT MEDICAL HISTORY:      Danya Peabody noted history of ear pressure in right ear. She will have bilateral myringotomy procedure on 6/28/2022 with Dr Monica Lizarraga. IMPRESSIONS:      Today's results are consistent with asymmetrical conductive hearing loss of left ear, sensorineural hearing loss of right ear with excellent word recognition, severe negative middle ear pressure bilaterally. Patient to follow medical recommendations per  Brody Warren MD.    ASSESSMENT AND FINDINGS:     Otoscopy revealed: Visible tympanic membrane bilaterally    Reliability: Good   Transducer: Inserts    RIGHT EAR:  Hearing Sensitivity: Normal to mild sensorineural hearing loss. Speech Recognition Threshold: 30 dB HL  Word Recognition: Excellent (%), based on NU-6   Tympanometry: Negative peak pressure with normal compliance, Type C tympanogram, consistent with ETD. Acoustic Reflexes: Ipsilateral: Absent     LEFT EAR:  Hearing Sensitivity: Mild to moderate asymmetrical conductive hearing loss; 15-25 dB air-bone gaps. Speech Recognition Threshold: 30 dB HL  Word Recognition: Excellent (%), based on NU-6   Tympanometry: Negative peak pressure with normal  compliance, Type C tympanogram, consistent with ETD.   Acoustic Reflexes: Ipsilateral: Absent at isolated frequencies. Hearing has remained stable from prior 1/2021 audiogram            Prior audiogram- 1-      COUNSELING:      Reviewed purpose of testing completed today, general auditory system function, and results obtained today. The following items are recommended based on patient report and results from today's appointment:   - Continue medical follow-up with Ketan Staples MD.   - Retest hearing as medically indicated and/or sooner if a change in hearing is noted. Chart CC'd to: Ketan Staples MD      Degree of   Hearing Sensitivity dB Range   Within Normal Limits (WNL) 0 - 20   Mild 20 - 40   Moderate 40 - 55   Moderately-Severe 55 - 70   Severe 70 - 90   Profound 90 +        RECOMMENDATIONS:        Ketan Staples MD to medically advise.     Jigar Sánchez  Audiologist    Electronically signed by Jigar Sánchez on 6/23/22 at 10:05 AM.

## 2022-06-27 ENCOUNTER — ANESTHESIA EVENT (OUTPATIENT)
Dept: OPERATING ROOM | Age: 42
End: 2022-06-27
Payer: OTHER GOVERNMENT

## 2022-06-28 ENCOUNTER — ANESTHESIA (OUTPATIENT)
Dept: OPERATING ROOM | Age: 42
End: 2022-06-28
Payer: OTHER GOVERNMENT

## 2022-06-28 ENCOUNTER — HOSPITAL ENCOUNTER (OUTPATIENT)
Age: 42
Setting detail: OUTPATIENT SURGERY
Discharge: HOME | End: 2022-06-28
Attending: OTOLARYNGOLOGY | Admitting: OTOLARYNGOLOGY
Payer: OTHER GOVERNMENT

## 2022-06-28 VITALS
WEIGHT: 213.5 LBS | BODY MASS INDEX: 37.83 KG/M2 | TEMPERATURE: 96.9 F | DIASTOLIC BLOOD PRESSURE: 81 MMHG | SYSTOLIC BLOOD PRESSURE: 113 MMHG | HEIGHT: 63 IN | RESPIRATION RATE: 18 BRPM | HEART RATE: 86 BPM | OXYGEN SATURATION: 94 %

## 2022-06-28 PROBLEM — H74.13: Status: ACTIVE | Noted: 2022-06-28

## 2022-06-28 PROBLEM — H69.83 CHRONIC EUSTACHIAN TUBE DYSFUNCTION, BILATERAL: Status: ACTIVE | Noted: 2022-06-28

## 2022-06-28 PROBLEM — H90.2 CONDUCTIVE HEARING LOSS: Status: ACTIVE | Noted: 2022-06-28

## 2022-06-28 PROBLEM — H69.93 CHRONIC EUSTACHIAN TUBE DYSFUNCTION, BILATERAL: Status: ACTIVE | Noted: 2022-06-28

## 2022-06-28 PROBLEM — H65.23 CHRONIC SEROUS OTITIS MEDIA OF BOTH EARS: Chronic | Status: ACTIVE | Noted: 2022-06-28

## 2022-06-28 LAB — HCG(URINE) PREGNANCY TEST: NEGATIVE

## 2022-06-28 PROCEDURE — 69436 CREATE EARDRUM OPENING: CPT | Performed by: OTOLARYNGOLOGY

## 2022-06-28 PROCEDURE — 3700000000 HC ANESTHESIA ATTENDED CARE: Performed by: OTOLARYNGOLOGY

## 2022-06-28 PROCEDURE — 7100000010 HC PHASE II RECOVERY - FIRST 15 MIN: Performed by: OTOLARYNGOLOGY

## 2022-06-28 PROCEDURE — 2580000003 HC RX 258: Performed by: OTOLARYNGOLOGY

## 2022-06-28 PROCEDURE — 6360000002 HC RX W HCPCS: Performed by: NURSE ANESTHETIST, CERTIFIED REGISTERED

## 2022-06-28 PROCEDURE — 84703 CHORIONIC GONADOTROPIN ASSAY: CPT

## 2022-06-28 PROCEDURE — 3600000002 HC SURGERY LEVEL 2 BASE: Performed by: OTOLARYNGOLOGY

## 2022-06-28 PROCEDURE — 2580000003 HC RX 258: Performed by: NURSE ANESTHETIST, CERTIFIED REGISTERED

## 2022-06-28 PROCEDURE — 2780000010 HC IMPLANT OTHER: Performed by: OTOLARYNGOLOGY

## 2022-06-28 PROCEDURE — 7100000011 HC PHASE II RECOVERY - ADDTL 15 MIN: Performed by: OTOLARYNGOLOGY

## 2022-06-28 PROCEDURE — 2500000003 HC RX 250 WO HCPCS: Performed by: NURSE ANESTHETIST, CERTIFIED REGISTERED

## 2022-06-28 PROCEDURE — 7100000000 HC PACU RECOVERY - FIRST 15 MIN: Performed by: OTOLARYNGOLOGY

## 2022-06-28 PROCEDURE — 3700000001 HC ADD 15 MINUTES (ANESTHESIA): Performed by: OTOLARYNGOLOGY

## 2022-06-28 PROCEDURE — 6370000000 HC RX 637 (ALT 250 FOR IP): Performed by: OTOLARYNGOLOGY

## 2022-06-28 PROCEDURE — 7100000001 HC PACU RECOVERY - ADDTL 15 MIN: Performed by: OTOLARYNGOLOGY

## 2022-06-28 PROCEDURE — 3600000012 HC SURGERY LEVEL 2 ADDTL 15MIN: Performed by: OTOLARYNGOLOGY

## 2022-06-28 PROCEDURE — 2709999900 HC NON-CHARGEABLE SUPPLY: Performed by: OTOLARYNGOLOGY

## 2022-06-28 DEVICE — TUBE VENT BUTTERFLY 1.27 MM 7.6X5.3 MM EAR TYMPANUM T SHP: Type: IMPLANTABLE DEVICE | Site: EAR | Status: FUNCTIONAL

## 2022-06-28 RX ORDER — HYDRALAZINE HYDROCHLORIDE 20 MG/ML
10 INJECTION INTRAMUSCULAR; INTRAVENOUS
Status: DISCONTINUED | OUTPATIENT
Start: 2022-06-28 | End: 2022-06-28 | Stop reason: HOSPADM

## 2022-06-28 RX ORDER — SULFACETAMIDE SODIUM AND PREDNISOLONE SODIUM PHOSPHATE 100; 2.3 MG/ML; MG/ML
SOLUTION/ DROPS OPHTHALMIC
Status: COMPLETED | OUTPATIENT
Start: 2022-06-28 | End: 2022-06-28

## 2022-06-28 RX ORDER — DEXAMETHASONE SODIUM PHOSPHATE 4 MG/ML
INJECTION, SOLUTION INTRA-ARTICULAR; INTRALESIONAL; INTRAMUSCULAR; INTRAVENOUS; SOFT TISSUE PRN
Status: DISCONTINUED | OUTPATIENT
Start: 2022-06-28 | End: 2022-06-28 | Stop reason: SDUPTHER

## 2022-06-28 RX ORDER — FENTANYL CITRATE 50 UG/ML
25 INJECTION, SOLUTION INTRAMUSCULAR; INTRAVENOUS EVERY 5 MIN PRN
Status: DISCONTINUED | OUTPATIENT
Start: 2022-06-28 | End: 2022-06-28 | Stop reason: HOSPADM

## 2022-06-28 RX ORDER — LABETALOL HYDROCHLORIDE 5 MG/ML
10 INJECTION, SOLUTION INTRAVENOUS
Status: DISCONTINUED | OUTPATIENT
Start: 2022-06-28 | End: 2022-06-28 | Stop reason: HOSPADM

## 2022-06-28 RX ORDER — PROPOFOL 10 MG/ML
INJECTION, EMULSION INTRAVENOUS PRN
Status: DISCONTINUED | OUTPATIENT
Start: 2022-06-28 | End: 2022-06-28 | Stop reason: SDUPTHER

## 2022-06-28 RX ORDER — SODIUM CHLORIDE 9 MG/ML
INJECTION, SOLUTION INTRAVENOUS CONTINUOUS
Status: DISCONTINUED | OUTPATIENT
Start: 2022-06-28 | End: 2022-06-28 | Stop reason: HOSPADM

## 2022-06-28 RX ORDER — GLYCOPYRROLATE 0.2 MG/ML
INJECTION INTRAMUSCULAR; INTRAVENOUS PRN
Status: DISCONTINUED | OUTPATIENT
Start: 2022-06-28 | End: 2022-06-28 | Stop reason: SDUPTHER

## 2022-06-28 RX ORDER — HYDROMORPHONE HCL 110MG/55ML
0.5 PATIENT CONTROLLED ANALGESIA SYRINGE INTRAVENOUS EVERY 5 MIN PRN
Status: DISCONTINUED | OUTPATIENT
Start: 2022-06-28 | End: 2022-06-28 | Stop reason: HOSPADM

## 2022-06-28 RX ORDER — SODIUM CHLORIDE 9 MG/ML
INJECTION, SOLUTION INTRAVENOUS CONTINUOUS PRN
Status: DISCONTINUED | OUTPATIENT
Start: 2022-06-28 | End: 2022-06-28 | Stop reason: SDUPTHER

## 2022-06-28 RX ORDER — SULFACETAMIDE SODIUM AND PREDNISOLONE SODIUM PHOSPHATE 100; 2.3 MG/ML; MG/ML
1 SOLUTION/ DROPS OPHTHALMIC
Status: DISCONTINUED | OUTPATIENT
Start: 2022-06-28 | End: 2022-06-28 | Stop reason: HOSPADM

## 2022-06-28 RX ORDER — LIDOCAINE HYDROCHLORIDE 20 MG/ML
INJECTION, SOLUTION EPIDURAL; INFILTRATION; INTRACAUDAL; PERINEURAL PRN
Status: DISCONTINUED | OUTPATIENT
Start: 2022-06-28 | End: 2022-06-28 | Stop reason: SDUPTHER

## 2022-06-28 RX ORDER — PROCHLORPERAZINE EDISYLATE 5 MG/ML
5 INJECTION INTRAMUSCULAR; INTRAVENOUS
Status: DISCONTINUED | OUTPATIENT
Start: 2022-06-28 | End: 2022-06-28 | Stop reason: HOSPADM

## 2022-06-28 RX ORDER — PROMETHAZINE HYDROCHLORIDE 25 MG/1
25 TABLET ORAL EVERY 6 HOURS PRN
Qty: 20 TABLET | Refills: 0 | Status: SHIPPED | OUTPATIENT
Start: 2022-06-28

## 2022-06-28 RX ORDER — LIDOCAINE HYDROCHLORIDE 10 MG/ML
1 INJECTION, SOLUTION EPIDURAL; INFILTRATION; INTRACAUDAL; PERINEURAL
Status: CANCELLED | OUTPATIENT
Start: 2022-06-28 | End: 2022-06-28

## 2022-06-28 RX ORDER — ONDANSETRON 2 MG/ML
INJECTION INTRAMUSCULAR; INTRAVENOUS PRN
Status: DISCONTINUED | OUTPATIENT
Start: 2022-06-28 | End: 2022-06-28 | Stop reason: SDUPTHER

## 2022-06-28 RX ORDER — OXYCODONE HYDROCHLORIDE 5 MG/1
5 TABLET ORAL
Status: DISCONTINUED | OUTPATIENT
Start: 2022-06-28 | End: 2022-06-28 | Stop reason: HOSPADM

## 2022-06-28 RX ORDER — FENTANYL CITRATE 50 UG/ML
INJECTION, SOLUTION INTRAMUSCULAR; INTRAVENOUS PRN
Status: DISCONTINUED | OUTPATIENT
Start: 2022-06-28 | End: 2022-06-28 | Stop reason: SDUPTHER

## 2022-06-28 RX ORDER — ONDANSETRON 2 MG/ML
4 INJECTION INTRAMUSCULAR; INTRAVENOUS
Status: DISCONTINUED | OUTPATIENT
Start: 2022-06-28 | End: 2022-06-28 | Stop reason: HOSPADM

## 2022-06-28 RX ADMIN — GLYCOPYRROLATE 0.2 MG: 1 INJECTION INTRAMUSCULAR; INTRAVENOUS at 07:45

## 2022-06-28 RX ADMIN — FENTANYL CITRATE 25 MCG: 50 INJECTION, SOLUTION INTRAMUSCULAR; INTRAVENOUS at 07:51

## 2022-06-28 RX ADMIN — LIDOCAINE HYDROCHLORIDE 100 MG: 20 INJECTION, SOLUTION EPIDURAL; INFILTRATION; INTRACAUDAL; PERINEURAL at 07:37

## 2022-06-28 RX ADMIN — PROPOFOL 30 MG: 10 INJECTION, EMULSION INTRAVENOUS at 07:51

## 2022-06-28 RX ADMIN — FENTANYL CITRATE 25 MCG: 50 INJECTION, SOLUTION INTRAMUSCULAR; INTRAVENOUS at 07:45

## 2022-06-28 RX ADMIN — SODIUM CHLORIDE: 9 INJECTION, SOLUTION INTRAVENOUS at 07:30

## 2022-06-28 RX ADMIN — PROPOFOL 200 MG: 10 INJECTION, EMULSION INTRAVENOUS at 07:37

## 2022-06-28 RX ADMIN — DEXAMETHASONE SODIUM PHOSPHATE 4 MG: 4 INJECTION, SOLUTION INTRAMUSCULAR; INTRAVENOUS at 07:42

## 2022-06-28 RX ADMIN — SODIUM CHLORIDE: 9 INJECTION, SOLUTION INTRAVENOUS at 07:16

## 2022-06-28 RX ADMIN — ONDANSETRON 4 MG: 2 INJECTION INTRAMUSCULAR; INTRAVENOUS at 07:42

## 2022-06-28 RX ADMIN — FENTANYL CITRATE 50 MCG: 50 INJECTION, SOLUTION INTRAMUSCULAR; INTRAVENOUS at 07:37

## 2022-06-28 ASSESSMENT — ENCOUNTER SYMPTOMS: SHORTNESS OF BREATH: 0

## 2022-06-28 ASSESSMENT — PAIN - FUNCTIONAL ASSESSMENT: PAIN_FUNCTIONAL_ASSESSMENT: 0-10

## 2022-06-28 NOTE — PROGRESS NOTES
Discharge instructions review with patient and pt . All home medications have been reviewed, pt v/u. Discharge instructions signed. Pt discharged via wheelchair. Pt discharged with ear drops, discharge information and all belongings.  taking stable pt home.

## 2022-06-28 NOTE — H&P
Date of Surgery Update:  Ilya Cheema was seen, history and physical examination reviewed, and patient examined by me today. There have been no significant clinical changes since the completion of the previous history and physical.    The risk, benefits, and alternatives of the proposed procedure have been explained to the patient (or appropriate guardian) and understanding verbalized. All questions answered. Patient wishes to proceed.     Electronically signed by: Jitendra Marte MD,6/28/2022,7:22 AM

## 2022-06-28 NOTE — BRIEF OP NOTE
Brief Postoperative Note      Patient: Christine Blevins  YOB: 1980  MRN: [id number]    Date of Procedure: 6/28/2022    Pre-Op Diagnosis:   1. Chronic Eustachian tube dysfunction, bilateral [H69.83]  2. Chronic adhesive otitis media, bilateral [H74.13]  3. Conductive hearing loss, unspecified laterality [H90.2]    Post-Op Diagnosis: Same       Procedure(s):  BILATERAL MYRINGOTOMY AND TYMPANOSTOMY WITH BUTTERFLY TUBES    Surgeon(s):  Mookie Dunaway MD    Assistant:  * No surgical staff found *    Anesthesia: General    Estimated Blood Loss (mL): Minimal    Complications: None    Specimens:   * No specimens in log *    Implants:  Implant Name Type Inv. Item Serial No.  Lot No. LRB No. Used Action   BUTTERFLY VENT TUBE 6.26 MM ID SILICONE GYRUS      [id number] Right 1 Implanted   BUTTERFLY VENT TUBE 7.45 MM ID SILICONE GYRUS     [employer] [id number] Left 1 Implanted         Drains: * No LDAs found *    Findings: The tympanic membranes were retracted, left > right, and onto the medial wall of the tympanic cavity and I-S joint, but elevated completely. There was minimal ONI, with no purulence. The tympanic membranes were otherwise dull, thinned, and nonerythematous.   The EACs were normal.         Electronically signed by Mookie Dunaway MD on 6/28/2022 at 8:07 AM

## 2022-06-28 NOTE — PROGRESS NOTES
Pt arrived from OR to PACU bay 4. Reported received from [place] staff. Pt asleep, unresponsive at time of arrival, spontaneous respirations noted; pt with simple mask in place infusing 4L oxygen per CRNA, oral airway in place at time of arrival, vitals as charted.

## 2022-06-28 NOTE — OP NOTE
Operative Note      Patient: Desiree Haile  YOB: 1980  MRN: [id number]    Date of Procedure: 6/28/2022    Pre-Op Diagnosis: Chronic Eustachian tube dysfunction, bilateral [H69.83]  Chronic adhesive otitis media, bilateral [H74.13]  Conductive hearing loss, unspecified laterality [H90.2]    Post-Op Diagnosis: Same       Procedure(s):  BILATERAL MYRINGOTOMY AND TYMPANOSTOMY WITH BUTTERFLY TUBES    Surgeon(s):  Devorah Fortune MD    Assistant:   * No surgical staff found *    Anesthesia: General    Estimated Blood Loss (mL): Minimal    Complications: None    Specimens:   * No specimens in log *    Implants:  Implant Name Type Inv. Item Serial No.  Lot No. LRB No. Used Action   BUTTERFLY VENT TUBE 1.42 MM ID SILICONE GYRUS      [id number] Right 1 Implanted   BUTTERFLY VENT TUBE 2.86 MM ID SILICONE GYRUS     [employer] [id number] Left 1 Implanted         Drains: * No LDAs found *    Findings: The tympanic membranes were retracted, left > right, and onto the medial wall of the tympanic cavity and I-S joint, but elevated completely. There was minimal ONI, with no purulence. The tympanic membranes were otherwise dull, thinned, and nonerythematous. The EACs were normal.      Detailed Description of Procedure:   Patient was taken to the operating room and placed in the supine position. Adequate and uncomplicated general anesthesia was induced maintained by the anesthetist using an LMA device. Patient was then positioned and draped in a sterile manner. The ear operating microscope was brought into position and the right external auditory canal and tympanic membrane examined. There was minimal cerumen. The tympanic membrane was visualized. A radial myringotomy incision was made inferior to the umbo with the myringotomy knife. There was minimal middle ear effusion. The butterfly PE tube was inserted in the usual manner into the myringotomy incision.   Sulfacetamide and prednisolone ophthalmic solution was then instilled into the external auditory canal down to the level of the PE tube. A cottonball meatal dressing was placed. The patient's head was then turned and an identical PE tube placed by an identical procedure in the left eardrum. The patient was then awakened, extubated, and taken to recovery room in stable condition having tolerated this procedure well with no evidence of perioperative complications and minimal blood loss less than 1 mL.         Electronically signed by Eleanor Jessica MD on 6/28/2022 at 8:14 AM

## 2022-06-28 NOTE — ANESTHESIA PRE PROCEDURE
Department of Anesthesiology  Preprocedure Note       Name:  Deepa Garcia   Age:  36 y.o.  :  1980                                          MRN:  [id number]         Date:  2022      Surgeon: Janina Heard):  Steve Kline MD    Procedure: Procedure(s):  BILATERAL MYRINGOTOMY AND TYMPANOPLASTY WITH BUTTERFLY TUBES    Medications prior to admission:   Prior to Admission medications    Medication Sig Start Date End Date Taking? Authorizing Provider   fexofenadine (ALLEGRA ALLERGY) 60 MG tablet Take 60 mg by mouth daily    Historical Provider, MD   desvenlafaxine succinate (PRISTIQ) 100 MG TB24 extended release tablet Take 100 mg by mouth daily    Historical Provider, MD   Multiple Vitamins-Minerals (THERAPEUTIC MULTIVITAMIN-MINERALS) tablet Take 1 tablet by mouth daily  Patient not taking: Reported on 2022    Historical Provider, MD   Etonogestrel-Ethinyl Estradiol ([address]) Place vaginally    Historical Provider, MD   fluticasone (FLONASE) 50 MCG/ACT nasal spray 2 sprays in each nostril daily. 19   Steve Kline MD       Current medications:    No current facility-administered medications for this visit. No current outpatient medications on file. Facility-Administered Medications Ordered in Other Visits   Medication Dose Route Frequency Provider Last Rate Last Admin    0.9 % sodium chloride infusion   IntraVENous Continuous Steve Kline MD           Allergies: Allergies   Allergen Reactions    Agbbvjvk-Qkciqoo-Ikkudi [Fluocinolone] Nausea And Vomiting    Ciprofloxacin Nausea And Vomiting       Problem List:    Patient Active Problem List   Diagnosis Code     (spontaneous vaginal delivery) O80    ETD (Eustachian tube dysfunction), bilateral H69.83    Conductive hearing loss of left ear with restricted hearing of right ear H90. A12    Otalgia of left ear H92.02    Middle ear atelectasis, left H74.8X2    Delivery normal O80    Conductive hearing loss of left ear with unrestricted hearing of right ear H90.12    Patent pressure equalization (PE) tube Z96.22    Otalgia of both ears H92.03    Non morbid obesity, unspecified obesity type E66.9    Allergic rhinitis J30.9    OLEKSANDR (generalized anxiety disorder) F41.1       Past Medical History:        Diagnosis Date    Allergic rhinitis 3/16/2022    OLEKSANDR (generalized anxiety disorder) 3/16/2022    Non morbid obesity, unspecified obesity type 3/16/2022       Past Surgical History:        Procedure Laterality Date    MYRINGOTOMY Left 9/18/2020    LEFT MYRINGOTOMY AND INSERTION OF PRESSURE EQUALIZING TUBE, BALLOON DILATION OF BILATERAL EUSTACHIAN TUBES performed by Sami Jaimes MD at [address] [facility] TYMPANOSTOMY TUBE PLACEMENT N/A 8/15/2019    LEFT MYRINGOTOMY AND INSERTION OF LEFT PRESSURE EQUALIZING TUBE, BIOPSY OF NASOPHARYNX performed by Sami Jaimes MD at [address] [facility]  2002    Dr. Jose Escobar Bilateral 2002    Dr. Kiki Cho       Social History:    Social History     Tobacco Use    Smoking status: Never Smoker    Smokeless tobacco: Never Used   Substance Use Topics    Alcohol use: No                                Counseling given: Not Answered      Vital Signs (Current): There were no vitals filed for this visit.                                            BP Readings from Last 3 Encounters:   06/28/22 116/85   06/15/22 110/79   04/14/22 119/80       NPO Status:                                                                                 BMI:   Wt Readings from Last 3 Encounters:   06/28/22 213 lb 8 oz (96.8 kg)   06/15/22 211 lb (95.7 kg)   03/16/22 205 lb (93 kg)     There is no height or weight on file to calculate BMI.    CBC:   Lab Results   Component Value Date    WBC 10.1 05/02/2013    RBC 3.63 05/02/2013    HGB 11.6 05/02/2013    HCT 34.8 05/02/2013    MCV 95.9 05/02/2013    RDW 13.6 05/02/2013     05/02/2013       CMP: No results found for: NA, K, CL, CO2, BUN, CREATININE, GFRAA, AGRATIO, LABGLOM, GLUCOSE, GLU, PROT, CALCIUM, BILITOT, ALKPHOS, AST, ALT    POC Tests: No results for input(s): POCGLU, POCNA, POCK, POCCL, POCBUN, POCHEMO, POCHCT in the last 72 hours. Coags: No results found for: PROTIME, INR, APTT    HCG (If Applicable):   Lab Results   Component Value Date    PREGTESTUR Negative 06/28/2022        ABGs: No results found for: PHART, PO2ART, RHM7OMQ, FBQ1ANZ, BEART, B4CBSRHZ     Type & Screen (If Applicable):  Lab Results   Component Value Date    LABABO A 05/01/2013    LABRH Negative 05/01/2013       Drug/Infectious Status (If Applicable):  No results found for: HIV, HEPCAB    COVID-19 Screening (If Applicable):   Lab Results   Component Value Date    COVID19 NOT DETECTED 09/15/2020         Anesthesia Evaluation  Patient summary reviewed and Nursing notes reviewed no history of anesthetic complications:   Airway: Mallampati: I  TM distance: >3 FB   Neck ROM: full  Mouth opening: > = 3 FB   Dental: normal exam         Pulmonary:       (-) asthma and shortness of breath                           Cardiovascular:  Exercise tolerance: good (>4 METS),       (-) hypertension and  angina                Neuro/Psych:   (+) depression/anxiety    (-) CVA           GI/Hepatic/Renal:        (-) GERD and liver disease       Endo/Other:        (-) diabetes mellitus, hypothyroidism               Abdominal:             Vascular:     - PVD. Other Findings:             Anesthesia Plan      general     ASA 2       Induction: intravenous. MIPS: Postoperative opioids intended and Prophylactic antiemetics administered. Anesthetic plan and risks discussed with patient. Use of blood products discussed with patient whom. Plan discussed with CRNA.                     Avni Hughes MD   6/28/2022

## 2022-06-28 NOTE — ANESTHESIA POSTPROCEDURE EVALUATION
Department of Anesthesiology  Postprocedure Note    Patient: Yari Le  MRN: [id number]  YOB: 1980  Date of evaluation: 6/28/2022      Procedure Summary     Date: 06/28/22 Room / Location: [address]    Anesthesia Start: 0730 Anesthesia Stop: 6696    Procedure: BILATERAL MYRINGOTOMY AND TYMPANOSTOMY WITH BUTTERFLY TUBES (Bilateral Ear) Diagnosis:       Chronic Eustachian tube dysfunction, bilateral      Chronic adhesive otitis media, bilateral      Conductive hearing loss, unspecified laterality      (Chronic Eustachian tube dysfunction, bilateral [H69.83])      (Chronic adhesive otitis media, bilateral [H74.13])      (Conductive hearing loss, unspecified laterality [H90.2])    Surgeons: Mehul Qureshi MD Responsible Provider: Dionisio Mota MD    Anesthesia Type: general ASA Status: 2          Anesthesia Type: No value filed.     Mariluz Phase I: Mariluz Score: 5    Mariluz Phase II:        Anesthesia Post Evaluation    Patient location during evaluation: PACU  Patient participation: complete - patient participated  Level of consciousness: awake and alert  Airway patency: patent  Nausea & Vomiting: no nausea and no vomiting  Complications: no  Cardiovascular status: hemodynamically stable  Respiratory status: acceptable  Hydration status: stable  Multimodal analgesia pain management approach

## 2022-09-12 ENCOUNTER — OFFICE VISIT (OUTPATIENT)
Dept: ENT CLINIC | Age: 42
End: 2022-09-12
Payer: OTHER GOVERNMENT

## 2022-09-12 VITALS — SYSTOLIC BLOOD PRESSURE: 121 MMHG | TEMPERATURE: 98 F | DIASTOLIC BLOOD PRESSURE: 81 MMHG | HEART RATE: 86 BPM

## 2022-09-12 DIAGNOSIS — H90.12 CONDUCTIVE HEARING LOSS OF LEFT EAR WITH UNRESTRICTED HEARING OF RIGHT EAR: Chronic | ICD-10-CM

## 2022-09-12 DIAGNOSIS — H74.8X2: Chronic | ICD-10-CM

## 2022-09-12 DIAGNOSIS — H69.83 ETD (EUSTACHIAN TUBE DYSFUNCTION), BILATERAL: Primary | Chronic | ICD-10-CM

## 2022-09-12 DIAGNOSIS — Z96.22 PATENT PRESSURE EQUALIZATION (PE) TUBE: ICD-10-CM

## 2022-09-12 PROCEDURE — 99213 OFFICE O/P EST LOW 20 MIN: CPT | Performed by: OTOLARYNGOLOGY

## 2022-09-12 NOTE — PROGRESS NOTES
[address] [place] [address] ENT       Chief Complaint   Patient presents with    Ear Problem     Recheck ears and PE tubes. HISTORY OF PRESENT ILLNESS:   Jacob Montilla is a 36 y.o.  female here for recheck of ears and PE tubes. She stated that her hearing is about the same as before the PETs were inserted. Hearing, \"it's as good as it's ever been. \"        REVIEW OF SYSTEMS:   Constitutional:  Denied fever or chills. Ears, Nose Throat:  Denied otalgia, otorrhea, and sore throat. EXAMINATION:    Constitutional:  General appearance: Well developed, well nourished, no apparent deformities. Ability to communicate/quality of Voice:  Communicated without difficulty. Normal voice. Ears, nose, mouth, & throat:  Otocopic exam:  The TMs were normalized with patent PE tubes, in place with clear lumen on the right and unable to see down lumen on the left. The left atelectasis appeared to be completey resolved. No evidence of middle ear effusion. The EACs were normal bilaterally. External ears were normal.        IMPRESSION / DIAGNOSES / ORDERS:   Jacob Montlila was seen today for ear problem. Diagnoses and all orders for this visit:    ETD (Eustachian tube dysfunction), bilateral  Comments:  treated with PETs. Orders:  -     Mateus Milner, Audiology, [facility]    Middle ear atelectasis, left  Comments:  treated with PETs. [facility]:  [facility]    Conductive hearing loss of left ear with unrestricted hearing of right ear  Comments:  treated with PETs. Orders:  -     Mateus Milner, Audiology, [facility]    Patent pressure equalization (PE) tube  Comments:  bilateral.  Orders:  -     Mateus Fishman, Audiology, [facility]         RECOMMENDATIONS / PLAN:   Audiogram to check for persistent hearing loss. Continue water precautions for both ears.     Return in about 6 months (around 3/12/2023) for recheck ears and PE tubes, follow-up, and sooner if condition worsens.

## 2022-09-20 ENCOUNTER — PROCEDURE VISIT (OUTPATIENT)
Dept: AUDIOLOGY | Age: 42
End: 2022-09-20
Payer: OTHER GOVERNMENT

## 2022-09-20 DIAGNOSIS — Z96.22 BILATERAL PATENT PRESSURE EQUALIZATION (PE) TUBES: ICD-10-CM

## 2022-09-20 DIAGNOSIS — H90.A12 CONDUCTIVE HEARING LOSS OF LEFT EAR WITH RESTRICTED HEARING OF RIGHT EAR: Primary | ICD-10-CM

## 2022-09-20 PROCEDURE — 92557 COMPREHENSIVE HEARING TEST: CPT | Performed by: AUDIOLOGIST

## 2022-09-20 PROCEDURE — 92567 TYMPANOMETRY: CPT | Performed by: AUDIOLOGIST

## 2022-09-20 NOTE — PROGRESS NOTES
[facility]  Division of Audiology/Otolaryngology    9/20/2022     Patient name: Mariela Epstein  Primary Care Physician: Marcial Vyas MD   Medical Record Number: [id number]     Mariela Epstein   1980, 36 y.o. female   [id number]       Referring Provider: Meredith Saldaña MD  Referral Type: In an order in [address] [place] [address]    Reason for Visit: Determination of the effect of medication, surgery, or other treatment. ADULT AUDIOLOGIC EVALUATION                    Mariela Epstein is a 36 y.o. female seen today, 9/20/2022 , for audiologic evaluation. Patient was seen by referring provider Meredith Saldaña MD on 9/12/22 for bilateral eustachian tube dysfunction. AUDIOLOGIC AND OTHER PERTINENT MEDICAL HISTORY:      Mariela Epstein returns for audiogram post bilateral myringotomy tube insertion. Perceives no improvement in hearing since tube insertion. IMPRESSIONS:      Results are consistent with left sided asymmetrical conductive hearing loss with excellent word recognition for both ears, and PE tube in-situ, bilaterally. Continue to follow medical recommendations per  Meredith Saldaña MD.     ASSESSMENT AND FINDINGS:     Otoscopy revealed: Visible tympanic membrane bilaterally    Reliability: Good   Transducer: Inserts    NOTE: An asymmetry of > 10 dB is present from 9024-5753 Hz in left    RIGHT EAR:  Hearing Sensitivity: Borderline normal/mild sensorineural loss  Speech Recognition Threshold: 20 dB HL  Word Recognition: Excellent (%), based on NU-6   Tympanometry: Flat, no peak pressure or compliance, Type B tympanogram, with large ear canal volume, consistent with patent PE tube/TM perforation. Acoustic Reflexes: Ipsilateral: Present at normal sensation levels. LEFT EAR:  Hearing Sensitivity: Mild to moderate asymmetrical conductive hearing loss.   Speech Recognition Threshold: 30 dB HL  Word Recognition: Excellent (%), based on NU-6   Tympanometry: Flat, no peak pressure or compliance, Type B tympanogram, with large ear canal volume, consistent with patent PE tube/TM perforation. Acoustic Reflexes: Ipsilateral: Present at elevated sensation levels and Absent at isolated frequencies. Thresholds and discrimination are stable from prior 6/2022 audiogram         Prior 6/23/22 audiogram       COUNSELING:      Reviewed purpose of testing completed today, general auditory system function, and results obtained today. The following items are recommended based on patient report and results from today's appointment:   - Continue medical follow-up with Surekha Vilchis MD.   - Retest hearing as medically indicated and/or sooner if a change in hearing is noted. Chart CC'd to: Surekha Vilchis MD      Degree of   Hearing Sensitivity dB Range   Within Normal Limits (WNL) 0 - 20   Mild 20 - 40   Moderate 40 - 55   Moderately-Severe 55 - 70   Severe 70 - 90   Profound 90 +        RECOMMENDATIONS:        Surekha Vilchis MD to medically advise.     Jigar Duffy  Audiologist    Electronically signed by Jigar Duffy on 9/20/22 at 10:03 AM.

## 2022-11-17 ENCOUNTER — OFFICE VISIT (OUTPATIENT)
Dept: PULMONOLOGY | Age: 42
End: 2022-11-17
Payer: OTHER GOVERNMENT

## 2022-11-17 VITALS
SYSTOLIC BLOOD PRESSURE: 120 MMHG | HEART RATE: 88 BPM | BODY MASS INDEX: 39.73 KG/M2 | HEIGHT: 63 IN | OXYGEN SATURATION: 97 % | WEIGHT: 224.2 LBS | DIASTOLIC BLOOD PRESSURE: 78 MMHG

## 2022-11-17 DIAGNOSIS — G47.33 OSA (OBSTRUCTIVE SLEEP APNEA): Primary | ICD-10-CM

## 2022-11-17 DIAGNOSIS — E66.9 NON MORBID OBESITY, UNSPECIFIED OBESITY TYPE: Chronic | ICD-10-CM

## 2022-11-17 PROCEDURE — 99214 OFFICE O/P EST MOD 30 MIN: CPT | Performed by: NURSE PRACTITIONER

## 2022-11-17 ASSESSMENT — SLEEP AND FATIGUE QUESTIONNAIRES
HOW LIKELY ARE YOU TO NOD OFF OR FALL ASLEEP WHEN YOU ARE A PASSENGER IN A CAR FOR AN HOUR WITHOUT A BREAK: 1
HOW LIKELY ARE YOU TO NOD OFF OR FALL ASLEEP WHILE SITTING QUIETLY AFTER LUNCH WITHOUT ALCOHOL: 0
HOW LIKELY ARE YOU TO NOD OFF OR FALL ASLEEP WHILE SITTING AND TALKING TO SOMEONE: 0
HOW LIKELY ARE YOU TO NOD OFF OR FALL ASLEEP IN A CAR, WHILE STOPPED FOR A FEW MINUTES IN TRAFFIC: 0
HOW LIKELY ARE YOU TO NOD OFF OR FALL ASLEEP WHILE LYING DOWN TO REST IN THE AFTERNOON WHEN CIRCUMSTANCES PERMIT: 3
ESS TOTAL SCORE: 6
HOW LIKELY ARE YOU TO NOD OFF OR FALL ASLEEP WHILE SITTING INACTIVE IN A PUBLIC PLACE: 0
HOW LIKELY ARE YOU TO NOD OFF OR FALL ASLEEP WHILE WATCHING TV: 1
HOW LIKELY ARE YOU TO NOD OFF OR FALL ASLEEP WHILE SITTING AND READING: 1

## 2022-11-17 NOTE — PROGRESS NOTES
Diagnosis: [x] CASSANDRA (G47.33) [] CSA (G47.31) [] Apnea (G47.30)   Length of Need: [x] 15 Months [] 99 Months [] Other:   Machine (REESE!): [] Respironics Dream Station      Auto [] ResMed AirSense     Auto [] Other:     []  CPAP () [] Bilevel ()   Mode: [] Auto [] Spontaneous    Mode: [] Auto [] Spontaneous                         Comfort Settings: Ramp Pressure: 5 cmH2O  Ramp time: 15 min  Flex/EPR - 3 full time                                  For ResMed Bileve (TiMax-4 sec   TiMin- 0.2 sec)     Humidifier: [] Heated ()        [] Water chamber replacement ()/ 1 per 6 months        Mask:   [] Nasal () /1 per 3 months [] Full Face () /1 per 3 months   [] Patient choice -Size and fit mask [] Patient Choice - Size and fit mask   [] Dispense: [] Dispense:   [] Headgear () / 1 per 3 months [] Headgear () / 1 per 3 months   [] Replacement Nasal Cushion ()/2 per month [] Interface Replacement ()/1 per month   [] Replacement Nasal Pillows ()/2 per month         Tubing: [] Heated ()/1 per 3 months    [] Standard ()/1 per 3 months [] Other:           Filters: [] Non-disposable ()/1 per 6 months     [] Ultra-Fine, Disposable ()/2 per month        Miscellaneous: [] Chin Strap ()/ 1 per 6 months [] O2 bleed-in:        LPM   [] Oxymetry on CPAP/Bilevel [x]  Other: Mask refit        Start Order Date: 11/17/22    MEDICAL JUSTIFICATION:  I, the undersigned, certify that the above prescribed supplies are medically necessary for this patients wellbeing. In my opinion, the supplies are both reasonable and necessary in reference to accepted standards of medicalpractice in treatment of this patients condition. Iraj Rocha NP    NPI: [id number]       Order Signed Date: 11/17/22  [address]  Pulmonary, Sleep, and Critical Care    Pulmonary, Sleep, and Critical Care  [address] [facility].  [address] [facility] , [address]                                    [facility]  Phone: [phone]    Fax: [phone]    Yair Kirk  1980  [address] [place] [address]  [phone] (home)   [phone] (mobile)      Insurance Info (confirm with patient if correct):  Payer/Plan Subscr  Sex Relation Sub.  Ins. ID Effective Group Num

## 2022-11-17 NOTE — ASSESSMENT & PLAN NOTE
Chronic-Improving, not fully controlled: Reviewed results of PSG and titration sleep study with patient and provided copy for her records. Reviewed and analyzed results of physiologic download from patient's machine and reviewed with patient. Supplies and parts as needed for her machine. These are medically necessary. Limit caffeine use after 3pm. Based on the analyzed data will continue with current settings. Encouraged her to work with her equipment company on mask fit and comfort. Discussed she could also try a mask liner to help control mask leak. Provided her with resources to view different styles of masks and mask liners. Will see her back in 3 months. Encouraged her to contact the office with any questions or concerns. Encouraged consistent use of her machine each night, all night. Discussed the importance of treating CASSANDRA from a physiological standpoint. Instructed not to drive unless had 4 hrs of effective therapy for her CASSANDRA the night before. Did review the risks of under or untreated CASSANDRA including, but not limited to, higher risks of motor vehicle accidents, stroke, heart attacks, and death. She understands and accepts all these risks.

## 2022-11-17 NOTE — LETTER
[facility]  [place] [address] [place] [address]  Phone: [phone]  Fax: [phone]    November 17, 2022       Patient: Dayanara Fuentes   MR Number: [id number]   YOB: 1980   Date of Visit: 11/17/2022       Susie Castro was seen for a follow up visit today. Here is my assessment and plan as well as an attached copy of her visit today:    Non morbid obesity, unspecified obesity type  Chronic-not stable:  Discussed importance of treating obstructive sleep apnea and getting sufficient sleep to assist with weight control. Encouraged her to work on weight loss through diet and exercise. Recommended DASH or Mediterranean diets. CASSANDRA (obstructive sleep apnea)  Chronic-Improving, not fully controlled: Reviewed results of PSG and titration sleep study with patient and provided copy for her records. Reviewed and analyzed results of physiologic download from patient's machine and reviewed with patient. Supplies and parts as needed for her machine. These are medically necessary. Limit caffeine use after 3pm. Based on the analyzed data will continue with current settings. Encouraged her to work with her equipment company on mask fit and comfort. Discussed she could also try a mask liner to help control mask leak. Provided her with resources to view different styles of masks and mask liners. Will see her back in 3 months. Encouraged her to contact the office with any questions or concerns. Encouraged consistent use of her machine each night, all night. Discussed the importance of treating CASSANDRA from a physiological standpoint. Instructed not to drive unless had 4 hrs of effective therapy for her CASSANDRA the night before. Did review the risks of under or untreated CASSANDRA including, but not limited to, higher risks of motor vehicle accidents, stroke, heart attacks, and death. She understands and accepts all these risks.           If you have questions or concerns, please do not hesitate to call me. I look forward to following Malcom Morales along with you.     Sincerely,    SLAVA Duncan    CC providers:  Mason Marshall MD  [facility] [address]  Via Fax: [phone]

## 2022-11-17 NOTE — PROGRESS NOTES
[facility]  [address] [facility], [address] [facility]- [phone]   [facility] [address]vin Delgado. [address] [place] [address]. Kathy Graves [address] [phone]     [facility] [address] [phone]      Assessment/Plan:      1. CASSANDRA (obstructive sleep apnea)  Assessment & Plan:  Chronic-Improving, not fully controlled: Reviewed results of PSG and titration sleep study with patient and provided copy for her records. Reviewed and analyzed results of physiologic download from patient's machine and reviewed with patient. Supplies and parts as needed for her machine. These are medically necessary. Limit caffeine use after 3pm. Based on the analyzed data will continue with current settings. Encouraged her to work with her equipment company on mask fit and comfort. Discussed she could also try a mask liner to help control mask leak. Provided her with resources to view different styles of masks and mask liners. Will see her back in 3 months. Encouraged her to contact the office with any questions or concerns. Encouraged consistent use of her machine each night, all night. Discussed the importance of treating CASSANDRA from a physiological standpoint. Instructed not to drive unless had 4 hrs of effective therapy for her CASSANDRA the night before. Did review the risks of under or untreated CASSANDRA including, but not limited to, higher risks of motor vehicle accidents, stroke, heart attacks, and death. She understands and accepts all these risks. 2. Non morbid obesity, unspecified obesity type  Assessment & Plan:  Chronic-not stable:  Discussed importance of treating obstructive sleep apnea and getting sufficient sleep to assist with weight control. Encouraged her to work on weight loss through diet and exercise. Recommended DASH or Mediterranean diets.       Reviewed, analyzed, 5/22/22: The study showed uncontrolled sleep-related breathing to disorder with several CPAP pressures. Patient failed CPAP and needs changed to bilevel. Recommend auto bilevel pressures: IPAP max: 25, EPAP min: 10, pressure support: 3 cm H2O. [address]    Franklin -  Franklin Sleepiness Score: 6    Social History     Socioeconomic History    Marital status:      Spouse name: Not on file    Number of children: Not on file    Years of education: Not on file    Highest education level: Not on file   Occupational History    Not on file   Tobacco Use    Smoking status: Never    Smokeless tobacco: Never   Vaping Use    Vaping Use: Never used   Substance and Sexual Activity    Alcohol use: No    Drug use: No    Sexual activity: Yes     Partners: Male   Other Topics Concern    Not on file   Social History Narrative    Not on file     Social Determinants of Health     Financial Resource Strain: Not on file   Food Insecurity: Not on file   Transportation Needs: Not on file   Physical Activity: Not on file   Stress: Not on file   Social Connections: Not on file   Intimate Partner Violence: Not on file   Housing Stability: Not on file       Current Outpatient Medications   Medication Instructions    desvenlafaxine succinate (PRISTIQ) 100 mg, Oral, DAILY    Etonogestrel-Ethinyl Estradiol (NUVARING VA) Vaginal    fexofenadine (ALLEGRA) 60 mg, Oral, DAILY    fluticasone (FLONASE) 50 MCG/ACT nasal spray 2 sprays in each nostril daily. Vitals:  Weight BMI   Wt Readings from Last 3 Encounters:   11/17/22 224 lb 3.2 oz (101.7 kg)   06/28/22 213 lb 8 oz (96.8 kg)   06/15/22 211 lb (95.7 kg)    Body mass index is 39.72 kg/m².      BP HR SaO2   BP Readings from Last 3 Encounters:   11/17/22 120/78   09/12/22 121/81   06/28/22 113/81    Pulse Readings from Last 3 Encounters:   11/17/22 88   09/12/22 86   06/28/22 86    SpO2 Readings from Last 3 Encounters:   11/17/22 97%   06/28/22 94% 06/15/22 97%        Electronically signed by SLAVA Wheatley on 11/17/2022 at 5:49 PM

## 2023-03-03 ENCOUNTER — OFFICE VISIT (OUTPATIENT)
Dept: PULMONOLOGY | Age: 43
End: 2023-03-03
Payer: OTHER GOVERNMENT

## 2023-03-03 VITALS
HEART RATE: 97 BPM | WEIGHT: 232.4 LBS | SYSTOLIC BLOOD PRESSURE: 134 MMHG | HEIGHT: 63 IN | DIASTOLIC BLOOD PRESSURE: 90 MMHG | BODY MASS INDEX: 41.18 KG/M2 | OXYGEN SATURATION: 93 %

## 2023-03-03 DIAGNOSIS — G47.33 OSA (OBSTRUCTIVE SLEEP APNEA): Primary | ICD-10-CM

## 2023-03-03 DIAGNOSIS — E66.9 NON MORBID OBESITY, UNSPECIFIED OBESITY TYPE: Chronic | ICD-10-CM

## 2023-03-03 PROCEDURE — 99214 OFFICE O/P EST MOD 30 MIN: CPT | Performed by: NURSE PRACTITIONER

## 2023-03-03 RX ORDER — OMEPRAZOLE 10 MG/1
10 CAPSULE, DELAYED RELEASE ORAL DAILY
COMMUNITY

## 2023-03-03 ASSESSMENT — SLEEP AND FATIGUE QUESTIONNAIRES
HOW LIKELY ARE YOU TO NOD OFF OR FALL ASLEEP WHILE SITTING INACTIVE IN A PUBLIC PLACE: 0
HOW LIKELY ARE YOU TO NOD OFF OR FALL ASLEEP IN A CAR, WHILE STOPPED FOR A FEW MINUTES IN TRAFFIC: 0
HOW LIKELY ARE YOU TO NOD OFF OR FALL ASLEEP WHILE WATCHING TV: 1
HOW LIKELY ARE YOU TO NOD OFF OR FALL ASLEEP WHILE SITTING QUIETLY AFTER LUNCH WITHOUT ALCOHOL: 0
HOW LIKELY ARE YOU TO NOD OFF OR FALL ASLEEP WHILE LYING DOWN TO REST IN THE AFTERNOON WHEN CIRCUMSTANCES PERMIT: 3
ESS TOTAL SCORE: 6
HOW LIKELY ARE YOU TO NOD OFF OR FALL ASLEEP WHEN YOU ARE A PASSENGER IN A CAR FOR AN HOUR WITHOUT A BREAK: 1
HOW LIKELY ARE YOU TO NOD OFF OR FALL ASLEEP WHILE SITTING AND TALKING TO SOMEONE: 0
HOW LIKELY ARE YOU TO NOD OFF OR FALL ASLEEP WHILE SITTING AND READING: 1

## 2023-03-03 NOTE — ASSESSMENT & PLAN NOTE
Chronic-Stable: Reviewed and analyzed results of physiologic download from patient's machine and reviewed with patient. Supplies and parts as needed for her machine. These are medically necessary. Limit caffeine use after 3pm. Based on the analyzed data will continue with current settings. Encouraged her to continue working with her DME on mask fit. Will see her back in 3 months. Encouraged her to contact the office with any questions or concerns. Encouraged consistent use of her machine each night, all night. Discussed the importance of treating Obstructive sleep apnea from a physiological standpoint. Instructed not to drive unless had 4 hrs of effective therapy for her CASSANDRA the night before. No driving when sleepy. Did review the risks of under or untreated CASSANDRA including, but not limited to, higher risks of motor vehicle accidents, stroke, heart attacks, and death. She understands and accepts all these risks.

## 2023-03-03 NOTE — PROGRESS NOTES
Keny Antoine CNP [address] 200 [facility], [address] [facility] [phone]   Jeison Lundberg  [address], [address] [place], [place] [address] [phone]     [address] [id number] [phone]      Assessment/Plan:      1. CASSANDRA (obstructive sleep apnea)  Assessment & Plan:  Chronic-Stable: Reviewed and analyzed results of physiologic download from patient's machine and reviewed with patient. Supplies and parts as needed for her machine. These are medically necessary. Limit caffeine use after 3pm. Based on the analyzed data will continue with current settings. Encouraged her to continue working with her DME on mask fit. Will see her back in 3 months. Encouraged her to contact the office with any questions or concerns. Encouraged consistent use of her machine each night, all night. Discussed the importance of treating Obstructive sleep apnea from a physiological standpoint. Instructed not to drive unless had 4 hrs of effective therapy for her CASSANDRA the night before. No driving when sleepy. Did review the risks of under or untreated CASSANDRA including, but not limited to, higher risks of motor vehicle accidents, stroke, heart attacks, and death. She understands and accepts all these risks. 2. Non morbid obesity, unspecified obesity type  Assessment & Plan:   Chronic-not stable:  Discussed importance of treating obstructive sleep apnea and getting sufficient sleep to assist with weight control. Encouraged her to work on weight loss through diet and exercise. Recommended DASH or Mediterranean diets. Reviewed, analyzed, and documented physiologic data from patient's PAP machine. This information was analyzed to assess complexity and medical decision making in regards to further testing and management.     The primary encounter diagnosis was CASSANDRA (obstructive sleep apnea). A diagnosis of Non morbid obesity, unspecified obesity type was also pertinent to this visit. The chronic medical conditions listed are directly related to the primary diagnosis listed above. The management of the primary diagnosis affects the secondary diagnosis and vice versa. Subjective:   Subjective   Patient ID: Lakia Guzmán is a 36 y.o. female. Chief Complaint   Patient presents with    Sleep Apnea       HPI:  Machine Modem/Download Info:  Compliance (hours/night): 6.12 hrs/night  % of nights >= 4 hrs: 33 %  Download AHI (/hour): 1.9 /HR   Average IPAP Pressure: 14.8 cmH2O  Average EPAP Pressure: 11.8 cmH2O               AUTO BILEVEL - Settings (ResMed)  IPAP Max: 25 cmH2O  EPAP Min: 10 cmH2O  Pressure Support: 3         PAP Mask  Mask Type: Full Face mask     Lakia Guzmán presents today for follow-up for sleep apnea. she reports she is doing well better with her machine. She recently obtained a mask liner and feels the mask is fitting better. She is still trying to get use to sleeping on her side to help with mask fit. She prefers to sleep on her back but has more leak. Wants to try it a little longer before trying a new mask. The pressure on her machine is comfortable and she is waking rested. she denies headaches, congestion, nosebleeds, dryness, aerophagia, or drowsiness while driving. She has a plasma donation for she would like completed.     [facility]    Ledger - Ledger Sleepiness Score: 6    Social History     Socioeconomic History    Marital status:      Spouse name: Not on file    Number of children: Not on file    Years of education: Not on file    Highest education level: Not on file   Occupational History    Not on file   Tobacco Use    Smoking status: Never    Smokeless tobacco: Never   Vaping Use    Vaping Use: Never used   Substance and Sexual Activity    Alcohol use: No    Drug use: No    Sexual activity: Yes Partners: Male   Other Topics Concern    Not on file   Social History Narrative    Not on file     Social Determinants of Health     Financial Resource Strain: Not on file   Food Insecurity: Not on file   Transportation Needs: Not on file   Physical Activity: Not on file   Stress: Not on file   Social Connections: Not on file   Intimate Partner Violence: Not on file   Housing Stability: Not on file       Current Outpatient Medications   Medication Instructions    desvenlafaxine succinate (PRISTIQ) 100 mg, Oral, DAILY    Etonogestrel-Ethinyl Estradiol (NUVARING VA) Vaginal    fexofenadine (ALLEGRA) 60 mg, Oral, DAILY    fluticasone (FLONASE) 50 MCG/ACT nasal spray 2 sprays in each nostril daily. omeprazole (PRILOSEC) 10 mg, Oral, DAILY          Vitals:  Weight BMI   Wt Readings from Last 3 Encounters:   03/03/23 232 lb 6.4 oz (105.4 kg)   11/17/22 224 lb 3.2 oz (101.7 kg)   06/28/22 213 lb 8 oz (96.8 kg)    Body mass index is 41.17 kg/m².      BP HR SaO2   BP Readings from Last 3 Encounters:   03/03/23 (!) 134/90   11/17/22 120/78   09/12/22 121/81    Pulse Readings from Last 3 Encounters:   03/03/23 97   11/17/22 88   09/12/22 86    SpO2 Readings from Last 3 Encounters:   03/03/23 93%   11/17/22 97%   06/28/22 94%        Electronically signed by SLAVA Braun on 3/3/2023 at 11:18 AM

## 2023-03-03 NOTE — LETTER
[facility]  [address] [facility]  Josselin Arellano  [address]  Phone: [phone]  Fax: [phone]    March 3, 2023       Patient: Taiwo Hartmann   MR Number: [id number]   YOB: 1980   Date of Visit: 3/3/2023       Mey Damian was seen for a follow up visit today. Here is my assessment and plan as well as an attached copy of her visit today:    Non morbid obesity, unspecified obesity type   Chronic-not stable:  Discussed importance of treating obstructive sleep apnea and getting sufficient sleep to assist with weight control. Encouraged her to work on weight loss through diet and exercise. Recommended DASH or Mediterranean diets. CASSANDRA (obstructive sleep apnea)  Chronic-Stable: Reviewed and analyzed results of physiologic download from patient's machine and reviewed with patient. Supplies and parts as needed for her machine. These are medically necessary. Limit caffeine use after 3pm. Based on the analyzed data will continue with current settings. Encouraged her to continue working with her DME on mask fit. Will see her back in 3 months. Encouraged her to contact the office with any questions or concerns. Encouraged consistent use of her machine each night, all night. Discussed the importance of treating Obstructive sleep apnea from a physiological standpoint. Instructed not to drive unless had 4 hrs of effective therapy for her CASSANDRA the night before. No driving when sleepy. Did review the risks of under or untreated CASSANDRA including, but not limited to, higher risks of motor vehicle accidents, stroke, heart attacks, and death. She understands and accepts all these risks. If you have questions or concerns, please do not hesitate to call me. I look forward to following Nidia Maay along with you.     Sincerely,    SLAVA Sanchez providers:  Deysi Akhtar MD  [address] [facility]  [address] [facility] [address] [address]  Via Fax: [phone]

## 2023-03-03 NOTE — PROGRESS NOTES
Diagnosis: [x] CASSANDRA (G47.33) [] CSA (G47.31) [] Apnea (G47.30)   Length of Need: [x] 15 Months [] 99 Months [] Other:   Machine (REESE!): [] Respironics Dream Station      Auto [] ResMed AirSense     Auto [] Other:     []  CPAP () [] Bilevel ()   Mode: [] Auto [] Spontaneous    Mode: [] Auto [] Spontaneous             Comfort Settings:      Humidifier: [] Heated ()        [x] Water chamber replacement ()/ 1 per 6 months        Mask:   [] Nasal () /1 per 3 months [x] Full Face () /1 per 3 months   [] Patient choice -Size and fit mask [x] Patient Choice - Size and fit mask   [] Dispense: [] Dispense:   [] Headgear () / 1 per 3 months [x] Headgear () / 1 per 3 months   [] Replacement Nasal Cushion ()/2 per month [x] Interface Replacement ()/1 per month   [] Replacement Nasal Pillows ()/2 per month         Tubing: [x] Heated ()/1 per 3 months    [] Standard ()/1 per 3 months [] Other:           Filters: [x] Non-disposable ()/1 per 6 months     [x] Ultra-Fine, Disposable ()/2 per month        Miscellaneous: [] Chin Strap ()/ 1 per 6 months [] O2 bleed-in:        LPM   [] Oxymetry on CPAP/Bilevel [x]  Other: Mask Re-fit        Start Order Date: 03/03/23    MEDICAL JUSTIFICATION:  I, the undersigned, certify that the above prescribed supplies are medically necessary for this patients wellbeing. In my opinion, the supplies are both reasonable and necessary in reference to accepted standards of medicalpractice in treatment of this patients condition. Kerrin Severs NP    NPI: [id number]       Order Signed Date: 03/03/23  [address] [place] [address]  Pulmonary, Sleep, and Critical Care    Pulmonary, Sleep, and Critical Care  [facility] [place] [address], [address] [place] [address] , [address] [facility]  Phone: [phone]    Fax: [address]  1980  [address] [facility]  [phone] (home)   [phone] (mobile)      Insurance Info (confirm with patient if correct):  Payer/Plan Subscr  Sex Relation Sub.  Ins. ID Effective Group Num

## 2023-03-03 NOTE — ASSESSMENT & PLAN NOTE
Chronic-not stable:  Discussed importance of treating obstructive sleep apnea and getting sufficient sleep to assist with weight control.  Encouraged her to work on weight loss through diet and exercise. Recommended DASH or Mediterranean diets.

## 2023-04-18 ENCOUNTER — TELEPHONE (OUTPATIENT)
Dept: PULMONOLOGY | Age: 43
End: 2023-04-18

## 2023-04-18 NOTE — TELEPHONE ENCOUNTER
She would not be a candidate for an oral appliance due to the severity of her sleep apnea. Would recommend moving her appt up to discuss.

## 2023-04-18 NOTE — TELEPHONE ENCOUNTER
Pt LM stating Alvera Milling told her to call back if she is having any issues. Pt states she wants to talk about doing an oral appliance instead and since she is having difficulty with her machine. Please advise.     Ph. [phone]

## 2023-04-21 ENCOUNTER — OFFICE VISIT (OUTPATIENT)
Dept: PULMONOLOGY | Age: 43
End: 2023-04-21
Payer: OTHER GOVERNMENT

## 2023-04-21 VITALS
HEIGHT: 63 IN | OXYGEN SATURATION: 97 % | SYSTOLIC BLOOD PRESSURE: 127 MMHG | WEIGHT: 231 LBS | DIASTOLIC BLOOD PRESSURE: 82 MMHG | BODY MASS INDEX: 40.93 KG/M2 | HEART RATE: 92 BPM

## 2023-04-21 DIAGNOSIS — E66.9 NON MORBID OBESITY, UNSPECIFIED OBESITY TYPE: Chronic | ICD-10-CM

## 2023-04-21 DIAGNOSIS — G47.33 OSA (OBSTRUCTIVE SLEEP APNEA): Primary | ICD-10-CM

## 2023-04-21 PROCEDURE — 99214 OFFICE O/P EST MOD 30 MIN: CPT | Performed by: NURSE PRACTITIONER

## 2023-04-21 ASSESSMENT — SLEEP AND FATIGUE QUESTIONNAIRES
HOW LIKELY ARE YOU TO NOD OFF OR FALL ASLEEP WHILE WATCHING TV: 2
HOW LIKELY ARE YOU TO NOD OFF OR FALL ASLEEP WHILE LYING DOWN TO REST IN THE AFTERNOON WHEN CIRCUMSTANCES PERMIT: 3
HOW LIKELY ARE YOU TO NOD OFF OR FALL ASLEEP WHILE SITTING QUIETLY AFTER LUNCH WITHOUT ALCOHOL: 1
HOW LIKELY ARE YOU TO NOD OFF OR FALL ASLEEP WHILE SITTING INACTIVE IN A PUBLIC PLACE: 0
HOW LIKELY ARE YOU TO NOD OFF OR FALL ASLEEP WHILE SITTING AND READING: 1
ESS TOTAL SCORE: 11
HOW LIKELY ARE YOU TO NOD OFF OR FALL ASLEEP WHILE SITTING AND TALKING TO SOMEONE: 0
HOW LIKELY ARE YOU TO NOD OFF OR FALL ASLEEP WHEN YOU ARE A PASSENGER IN A CAR FOR AN HOUR WITHOUT A BREAK: 3
HOW LIKELY ARE YOU TO NOD OFF OR FALL ASLEEP IN A CAR, WHILE STOPPED FOR A FEW MINUTES IN TRAFFIC: 1

## 2023-04-21 NOTE — PROGRESS NOTES
Babatunde Antoine CNP [address] 200 [facility], [address] [phone]   [facility] [address] [address] [phone]     [facility] [address] [address]  Dept: [phone]  Dept Fax: [phone]  Loc: [phone]      Assessment/Plan:      1. CASSANDRA (obstructive sleep apnea)  Assessment & Plan:  Chronic-with progression/exacerbation: Reviewed and analyzed results of physiologic download from patient's machine and reviewed with patient. Supplies and parts as needed for her machine. These are medically necessary. Limit caffeine use after 3pm. Based on the analyzed data will continue with current settings. Despite multiple adjustments to the pressure on her machine, she continues to struggle with daytime sleepiness. She has also had about 25 pound weight gain over the past 6 months and will need an in lab titration study. Will call her with the results as they become available. Absolutely no driving when sleepy. Patient was given a card to cloud machine. Spoke with Taj at [facility] to have them switch her machine to one with a modem. Encouraged her to continue working with her DME on mask fit and comfort. Encouraged her to contact the office with any questions or concerns. Encouraged consistent use of her machine each night, all night. Discussed the importance of treating Obstructive sleep apnea from a physiological standpoint. Instructed not to drive unless had 4 hrs of effective therapy for her CASSANDRA the night before. Did review the risks of under or untreated CASSANDRA including, but not limited to, higher risks of motor vehicle accidents, stroke, heart attacks, and death. She understands and accepts all these risks. Orders:  -     Sleep Study with PAP Titration; Future  2.  Non morbid obesity, unspecified obesity

## 2023-04-21 NOTE — ASSESSMENT & PLAN NOTE
Chronic-not stable: 25 pound weight gain over the past 6 months. Will place consult to Dr. Anna Castillo for assistance with weight loss. Discussed importance of treating obstructive sleep apnea and getting sufficient sleep to assist with weight control. Encouraged her to work on weight loss through diet and exercise. Recommended DASH or Mediterranean diets.

## 2023-04-24 ENCOUNTER — TELEPHONE (OUTPATIENT)
Dept: SLEEP CENTER | Age: 43
End: 2023-04-24

## 2023-04-24 NOTE — TELEPHONE ENCOUNTER
Called pt to schedule a bipap sleep study per Qamar Sarmiento  for the pt to return my call     [employer]

## 2023-05-03 ENCOUNTER — PATIENT MESSAGE (OUTPATIENT)
Dept: ENT CLINIC | Age: 43
End: 2023-05-03

## 2023-05-03 DIAGNOSIS — H69.83 ETD (EUSTACHIAN TUBE DYSFUNCTION), BILATERAL: ICD-10-CM

## 2023-05-03 RX ORDER — FLUTICASONE PROPIONATE 50 MCG
SPRAY, SUSPENSION (ML) NASAL
Qty: 3 EACH | Refills: 0 | Status: SHIPPED | OUTPATIENT
Start: 2023-05-03

## 2023-05-03 RX ORDER — FEXOFENADINE HYDROCHLORIDE 60 MG/1
60 TABLET, FILM COATED ORAL DAILY
Qty: 90 TABLET | Refills: 0 | Status: SHIPPED | OUTPATIENT
Start: 2023-05-03 | End: 2023-08-01

## 2023-05-03 NOTE — TELEPHONE ENCOUNTER
From: Byron Manley  To: Dr. Gil Clarity: 5/3/2023 1:51 PM EDT  Subject: Ulice Edu and Flonase    Hi! I wanted to see if my OTC meds (Allegra and Flonase) could be called in as prescriptions to [facility]? The insurance appears to cover them versus it being out of pocket costs at the store. Thanks!   Patrick Weber

## 2023-05-05 ENCOUNTER — HOSPITAL ENCOUNTER (OUTPATIENT)
Dept: MAMMOGRAPHY | Age: 43
Discharge: HOME | End: 2023-05-05
Payer: OTHER GOVERNMENT

## 2023-05-05 VITALS — BODY MASS INDEX: 39.87 KG/M2 | WEIGHT: 225 LBS | HEIGHT: 63 IN

## 2023-05-05 DIAGNOSIS — Z13.9 ENCOUNTER FOR SCREENING: ICD-10-CM

## 2023-05-05 PROCEDURE — 77063 BREAST TOMOSYNTHESIS BI: CPT

## 2023-07-16 ENCOUNTER — HOSPITAL ENCOUNTER (OUTPATIENT)
Dept: SLEEP CENTER | Age: 43
Discharge: HOME | End: 2023-07-16
Payer: OTHER GOVERNMENT

## 2023-07-16 DIAGNOSIS — G47.33 OSA (OBSTRUCTIVE SLEEP APNEA): ICD-10-CM

## 2023-07-16 PROCEDURE — 95811 POLYSOM 6/>YRS CPAP 4/> PARM: CPT

## 2023-07-18 PROCEDURE — 95811 POLYSOM 6/>YRS CPAP 4/> PARM: CPT | Performed by: INTERNAL MEDICINE

## 2023-07-19 ENCOUNTER — TELEPHONE (OUTPATIENT)
Dept: PULMONOLOGY | Age: 43
End: 2023-07-19

## 2023-07-19 DIAGNOSIS — H69.83 ETD (EUSTACHIAN TUBE DYSFUNCTION), BILATERAL: ICD-10-CM

## 2023-07-19 RX ORDER — FLUTICASONE PROPIONATE 50 MCG
SPRAY, SUSPENSION (ML) NASAL
Qty: 48 G | Refills: 0 | Status: SHIPPED | OUTPATIENT
Start: 2023-07-19

## 2023-07-19 NOTE — TELEPHONE ENCOUNTER
Spoke with pt to review titration results. Pt to bring unit in on 07/21/23 at 10:00am  to have pressure changes made.

## 2023-07-21 ENCOUNTER — TELEPHONE (OUTPATIENT)
Dept: PULMONOLOGY | Age: 43
End: 2023-07-21

## 2023-08-17 ENCOUNTER — TELEPHONE (OUTPATIENT)
Dept: BARIATRICS/WEIGHT MGMT | Age: 43
End: 2023-08-17

## 2023-08-17 NOTE — TELEPHONE ENCOUNTER
New pt courtesy call made lvm pt was in office week early - paperwork in media reminded of appt and arrival time.

## 2023-08-18 ENCOUNTER — OFFICE VISIT (OUTPATIENT)
Dept: BARIATRICS/WEIGHT MGMT | Age: 43
End: 2023-08-18

## 2023-08-18 VITALS
SYSTOLIC BLOOD PRESSURE: 125 MMHG | HEIGHT: 64 IN | HEART RATE: 85 BPM | DIASTOLIC BLOOD PRESSURE: 95 MMHG | OXYGEN SATURATION: 97 % | BODY MASS INDEX: 38.41 KG/M2 | WEIGHT: 225 LBS

## 2023-08-18 DIAGNOSIS — E66.9 OBESITY, CLASS II, BMI 35-39.9: Primary | ICD-10-CM

## 2023-08-18 RX ORDER — FEXOFENADINE HCL 180 MG/1
180 TABLET ORAL DAILY
COMMUNITY

## 2023-08-19 ENCOUNTER — TELEMEDICINE (OUTPATIENT)
Dept: BARIATRICS/WEIGHT MGMT | Age: 43
End: 2023-08-19
Payer: OTHER GOVERNMENT

## 2023-08-19 DIAGNOSIS — Z71.3 DIETARY COUNSELING AND SURVEILLANCE: ICD-10-CM

## 2023-08-19 DIAGNOSIS — E66.9 CLASS 2 OBESITY: Primary | ICD-10-CM

## 2023-08-19 PROCEDURE — 99204 OFFICE O/P NEW MOD 45 MIN: CPT | Performed by: FAMILY MEDICINE

## 2023-08-19 ASSESSMENT — ENCOUNTER SYMPTOMS
APNEA: 0
NAUSEA: 0
ABDOMINAL PAIN: 0
COUGH: 0
EYE PAIN: 0
SHORTNESS OF BREATH: 0
CONSTIPATION: 0
PHOTOPHOBIA: 0
ABDOMINAL DISTENTION: 0
CHEST TIGHTNESS: 0
WHEEZING: 0
DIARRHEA: 0
VOMITING: 0
CHOKING: 0
BLOOD IN STOOL: 0

## 2023-09-01 RX ORDER — FEXOFENADINE HCL 60 MG/1
TABLET, FILM COATED ORAL
Qty: 90 TABLET | Refills: 3 | OUTPATIENT
Start: 2023-09-01

## 2023-09-29 DIAGNOSIS — H69.83 ETD (EUSTACHIAN TUBE DYSFUNCTION), BILATERAL: ICD-10-CM

## 2023-10-04 NOTE — TELEPHONE ENCOUNTER
This is a request for a one year supply. I will consider this at the next visit on 10/12/23. I can send one bottle now to last until her appointment on 10/12/23, or she can  a bottle OTC, or she can wait until her visit. Let me know what she wants to do.

## 2023-10-05 RX ORDER — FLUTICASONE PROPIONATE 50 MCG
SPRAY, SUSPENSION (ML) NASAL
Qty: 48 G | Refills: 3 | OUTPATIENT
Start: 2023-10-05

## 2023-10-12 ENCOUNTER — OFFICE VISIT (OUTPATIENT)
Dept: ENT CLINIC | Age: 43
End: 2023-10-12
Payer: OTHER GOVERNMENT

## 2023-10-12 VITALS
DIASTOLIC BLOOD PRESSURE: 76 MMHG | WEIGHT: 216 LBS | HEIGHT: 64 IN | TEMPERATURE: 98.2 F | HEART RATE: 92 BPM | OXYGEN SATURATION: 96 % | SYSTOLIC BLOOD PRESSURE: 123 MMHG | BODY MASS INDEX: 36.88 KG/M2

## 2023-10-12 DIAGNOSIS — H69.83 ETD (EUSTACHIAN TUBE DYSFUNCTION), BILATERAL: Primary | Chronic | ICD-10-CM

## 2023-10-12 DIAGNOSIS — H74.8X2: Chronic | ICD-10-CM

## 2023-10-12 DIAGNOSIS — J34.3 HYPERTROPHY OF BOTH INFERIOR NASAL TURBINATES: Chronic | ICD-10-CM

## 2023-10-12 DIAGNOSIS — H90.12 CONDUCTIVE HEARING LOSS OF LEFT EAR WITH UNRESTRICTED HEARING OF RIGHT EAR: Chronic | ICD-10-CM

## 2023-10-12 DIAGNOSIS — Z96.22 PATENT PRESSURE EQUALIZATION (PE) TUBE: ICD-10-CM

## 2023-10-12 DIAGNOSIS — J34.89 NASAL OBSTRUCTION: Chronic | ICD-10-CM

## 2023-10-12 DIAGNOSIS — J34.2 NASAL SEPTAL DEVIATION: Chronic | ICD-10-CM

## 2023-10-12 PROCEDURE — 99214 OFFICE O/P EST MOD 30 MIN: CPT | Performed by: OTOLARYNGOLOGY

## 2023-10-12 NOTE — PATIENT INSTRUCTIONS
WATER PRECAUTIONS  Do not allow water to get into your right or left ears, as this may cause, or worsen, an ear infection. Before bathing, showering or washing your hair, a plug of cotton coated with petroleum jelly should be placed in the opening of your ear canal.  After bathing the cotton should be removed and the outer part of your ear dried with a soft towel. Alternatively, you may use a silicone putty ear plug purchased from your pharmacy. Swimming may be permitted if you wear adequate ear plugs. If water does enter your ear, drain the water out into a tissue or cotton ball. Call the office if you develop pain or drainage.

## 2023-10-16 DIAGNOSIS — H69.83 ETD (EUSTACHIAN TUBE DYSFUNCTION), BILATERAL: ICD-10-CM

## 2023-10-16 RX ORDER — FLUTICASONE PROPIONATE 50 MCG
SPRAY, SUSPENSION (ML) NASAL
Qty: 48 G | Refills: 1 | Status: SHIPPED | OUTPATIENT
Start: 2023-10-16

## 2023-10-16 RX ORDER — FEXOFENADINE HCL 180 MG/1
180 TABLET ORAL DAILY
Qty: 90 TABLET | Refills: 1 | Status: SHIPPED | OUTPATIENT
Start: 2023-10-16 | End: 2024-04-13

## 2023-11-06 NOTE — PROGRESS NOTES
Patient not reached. Preop instructions left on voice mail. Ytodys_[phone]______________    -Date__11/7/23  _____time__1045_____arrival__0915  [facility]__________  -Nothing to eat or drink after midnight  -Responsible adult 18 or older to stay on site while you are here and drive you home and stay with you after  -Follow any instructions your doctors office has given you  -Bring a complete list of all your medications and supplements  -If you normally take the following medications in the morning please do so with a small    sip of water-heart,blood pressure,seizure,breathing or thyroid-avoid water pilll Do not take blood pressure medications ending in \"sara\" or \"pril\" the AM of surgery or the mariposa prior  -You may use your inhalers  -Take half of your normal dose of any long acting insulins the night before-do not take    any diabetic medications in the morning  -Follow your doctors instructions regarding blood thinners  -Any questions call your surgeons office  -Please bring a hard copy of your history and physical with you to surgery            VISITOR POLICY(subject to change)    Current policy is 2 visitors per patient. No children. Masks at discretion of facility. Visiting hours are 8a-8p. Overnight visitors will be at the discretion of the nurse. All policies are subject to change.

## 2023-11-07 ENCOUNTER — ANESTHESIA (OUTPATIENT)
Dept: OPERATING ROOM | Age: 43
End: 2023-11-07
Payer: OTHER GOVERNMENT

## 2023-11-07 ENCOUNTER — ANESTHESIA EVENT (OUTPATIENT)
Dept: OPERATING ROOM | Age: 43
End: 2023-11-07
Payer: OTHER GOVERNMENT

## 2023-11-07 ENCOUNTER — HOSPITAL ENCOUNTER (OUTPATIENT)
Age: 43
Setting detail: OUTPATIENT SURGERY
Discharge: HOME | End: 2023-11-07
Attending: OTOLARYNGOLOGY | Admitting: OTOLARYNGOLOGY
Payer: OTHER GOVERNMENT

## 2023-11-07 VITALS
HEART RATE: 78 BPM | HEIGHT: 64 IN | SYSTOLIC BLOOD PRESSURE: 127 MMHG | TEMPERATURE: 97.3 F | DIASTOLIC BLOOD PRESSURE: 76 MMHG | WEIGHT: 215.3 LBS | BODY MASS INDEX: 36.76 KG/M2 | RESPIRATION RATE: 18 BRPM | OXYGEN SATURATION: 93 %

## 2023-11-07 DIAGNOSIS — J34.89 REFRACTORY OBSTRUCTION OF NASAL AIRWAY: ICD-10-CM

## 2023-11-07 DIAGNOSIS — J34.3 HYPERTROPHY OF INFERIOR NASAL TURBINATE: ICD-10-CM

## 2023-11-07 DIAGNOSIS — J34.2 DEVIATED NASAL SEPTUM: ICD-10-CM

## 2023-11-07 DIAGNOSIS — G89.18 POST-OP PAIN: Primary | ICD-10-CM

## 2023-11-07 LAB — HCG UR QL: NEGATIVE

## 2023-11-07 PROCEDURE — 3600000004 HC SURGERY LEVEL 4 BASE: Performed by: OTOLARYNGOLOGY

## 2023-11-07 PROCEDURE — 7100000001 HC PACU RECOVERY - ADDTL 15 MIN: Performed by: OTOLARYNGOLOGY

## 2023-11-07 PROCEDURE — 2500000003 HC RX 250 WO HCPCS: Performed by: STUDENT IN AN ORGANIZED HEALTH CARE EDUCATION/TRAINING PROGRAM

## 2023-11-07 PROCEDURE — 6370000000 HC RX 637 (ALT 250 FOR IP): Performed by: OTOLARYNGOLOGY

## 2023-11-07 PROCEDURE — 7100000011 HC PHASE II RECOVERY - ADDTL 15 MIN: Performed by: OTOLARYNGOLOGY

## 2023-11-07 PROCEDURE — 30802 ABLATE INF TURBINATE SUBMUC: CPT | Performed by: OTOLARYNGOLOGY

## 2023-11-07 PROCEDURE — 2580000003 HC RX 258: Performed by: ANESTHESIOLOGY

## 2023-11-07 PROCEDURE — 2500000003 HC RX 250 WO HCPCS: Performed by: OTOLARYNGOLOGY

## 2023-11-07 PROCEDURE — 6360000002 HC RX W HCPCS: Performed by: OTOLARYNGOLOGY

## 2023-11-07 PROCEDURE — 6360000002 HC RX W HCPCS: Performed by: STUDENT IN AN ORGANIZED HEALTH CARE EDUCATION/TRAINING PROGRAM

## 2023-11-07 PROCEDURE — 3700000000 HC ANESTHESIA ATTENDED CARE: Performed by: OTOLARYNGOLOGY

## 2023-11-07 PROCEDURE — 7100000000 HC PACU RECOVERY - FIRST 15 MIN: Performed by: OTOLARYNGOLOGY

## 2023-11-07 PROCEDURE — 3600000014 HC SURGERY LEVEL 4 ADDTL 15MIN: Performed by: OTOLARYNGOLOGY

## 2023-11-07 PROCEDURE — 2580000003 HC RX 258: Performed by: OTOLARYNGOLOGY

## 2023-11-07 PROCEDURE — 2720000010 HC SURG SUPPLY STERILE: Performed by: OTOLARYNGOLOGY

## 2023-11-07 PROCEDURE — 30130 EXCISE INFERIOR TURBINATE: CPT | Performed by: OTOLARYNGOLOGY

## 2023-11-07 PROCEDURE — 30930 THER FX NASAL INF TURBINATE: CPT | Performed by: OTOLARYNGOLOGY

## 2023-11-07 PROCEDURE — 88304 TISSUE EXAM BY PATHOLOGIST: CPT

## 2023-11-07 PROCEDURE — 6370000000 HC RX 637 (ALT 250 FOR IP): Performed by: ANESTHESIOLOGY

## 2023-11-07 PROCEDURE — 30520 REPAIR OF NASAL SEPTUM: CPT | Performed by: OTOLARYNGOLOGY

## 2023-11-07 PROCEDURE — 7100000010 HC PHASE II RECOVERY - FIRST 15 MIN: Performed by: OTOLARYNGOLOGY

## 2023-11-07 PROCEDURE — 2709999900 HC NON-CHARGEABLE SUPPLY: Performed by: OTOLARYNGOLOGY

## 2023-11-07 PROCEDURE — 3700000001 HC ADD 15 MINUTES (ANESTHESIA): Performed by: OTOLARYNGOLOGY

## 2023-11-07 PROCEDURE — 6360000002 HC RX W HCPCS: Performed by: ANESTHESIOLOGY

## 2023-11-07 PROCEDURE — 84703 CHORIONIC GONADOTROPIN ASSAY: CPT

## 2023-11-07 RX ORDER — HYDROMORPHONE HYDROCHLORIDE 2 MG/ML
0.25 INJECTION, SOLUTION INTRAMUSCULAR; INTRAVENOUS; SUBCUTANEOUS EVERY 5 MIN PRN
Status: DISCONTINUED | OUTPATIENT
Start: 2023-11-07 | End: 2023-11-07 | Stop reason: HOSPADM

## 2023-11-07 RX ORDER — MEPERIDINE HYDROCHLORIDE 25 MG/ML
12.5 INJECTION INTRAMUSCULAR; INTRAVENOUS; SUBCUTANEOUS EVERY 5 MIN PRN
Status: DISCONTINUED | OUTPATIENT
Start: 2023-11-07 | End: 2023-11-07 | Stop reason: HOSPADM

## 2023-11-07 RX ORDER — SODIUM CHLORIDE 0.9 % (FLUSH) 0.9 %
5-40 SYRINGE (ML) INJECTION EVERY 12 HOURS SCHEDULED
Status: DISCONTINUED | OUTPATIENT
Start: 2023-11-07 | End: 2023-11-07 | Stop reason: HOSPADM

## 2023-11-07 RX ORDER — DEXAMETHASONE SODIUM PHOSPHATE 4 MG/ML
INJECTION, SOLUTION INTRA-ARTICULAR; INTRALESIONAL; INTRAMUSCULAR; INTRAVENOUS; SOFT TISSUE PRN
Status: DISCONTINUED | OUTPATIENT
Start: 2023-11-07 | End: 2023-11-07 | Stop reason: SDUPTHER

## 2023-11-07 RX ORDER — OXYMETAZOLINE HYDROCHLORIDE 0.05 G/100ML
SPRAY NASAL
Status: COMPLETED | OUTPATIENT
Start: 2023-11-07 | End: 2023-11-07

## 2023-11-07 RX ORDER — CEFUROXIME AXETIL 250 MG/1
250 TABLET ORAL 2 TIMES DAILY
Qty: 14 TABLET | Refills: 0 | Status: SHIPPED | OUTPATIENT
Start: 2023-11-07 | End: 2023-11-14

## 2023-11-07 RX ORDER — HYDROCODONE BITARTRATE AND ACETAMINOPHEN 7.5; 325 MG/1; MG/1
1 TABLET ORAL EVERY 6 HOURS PRN
Qty: 24 TABLET | Refills: 0 | Status: SHIPPED | OUTPATIENT
Start: 2023-11-07 | End: 2023-11-13

## 2023-11-07 RX ORDER — ACETAMINOPHEN 500 MG
1000 TABLET ORAL ONCE
Status: COMPLETED | OUTPATIENT
Start: 2023-11-07 | End: 2023-11-07

## 2023-11-07 RX ORDER — BACITRACIN ZINC AND POLYMYXIN B SULFATE 500; 1000 [USP'U]/G; [USP'U]/G
OINTMENT TOPICAL
Status: COMPLETED | OUTPATIENT
Start: 2023-11-07 | End: 2023-11-07

## 2023-11-07 RX ORDER — LABETALOL HYDROCHLORIDE 5 MG/ML
INJECTION, SOLUTION INTRAVENOUS PRN
Status: DISCONTINUED | OUTPATIENT
Start: 2023-11-07 | End: 2023-11-07 | Stop reason: SDUPTHER

## 2023-11-07 RX ORDER — LIDOCAINE HYDROCHLORIDE 20 MG/ML
INJECTION, SOLUTION INFILTRATION; PERINEURAL PRN
Status: DISCONTINUED | OUTPATIENT
Start: 2023-11-07 | End: 2023-11-07 | Stop reason: SDUPTHER

## 2023-11-07 RX ORDER — PROMETHAZINE HYDROCHLORIDE 25 MG/1
25 TABLET ORAL EVERY 6 HOURS PRN
Qty: 40 TABLET | Refills: 0 | Status: SHIPPED | OUTPATIENT
Start: 2023-11-07

## 2023-11-07 RX ORDER — HYDROMORPHONE HYDROCHLORIDE 2 MG/ML
INJECTION, SOLUTION INTRAMUSCULAR; INTRAVENOUS; SUBCUTANEOUS PRN
Status: DISCONTINUED | OUTPATIENT
Start: 2023-11-07 | End: 2023-11-07 | Stop reason: SDUPTHER

## 2023-11-07 RX ORDER — ROCURONIUM BROMIDE 10 MG/ML
INJECTION, SOLUTION INTRAVENOUS PRN
Status: DISCONTINUED | OUTPATIENT
Start: 2023-11-07 | End: 2023-11-07 | Stop reason: SDUPTHER

## 2023-11-07 RX ORDER — ONDANSETRON 2 MG/ML
INJECTION INTRAMUSCULAR; INTRAVENOUS PRN
Status: DISCONTINUED | OUTPATIENT
Start: 2023-11-07 | End: 2023-11-07 | Stop reason: SDUPTHER

## 2023-11-07 RX ORDER — HYDROMORPHONE HYDROCHLORIDE 2 MG/ML
0.5 INJECTION, SOLUTION INTRAMUSCULAR; INTRAVENOUS; SUBCUTANEOUS EVERY 5 MIN PRN
Status: DISCONTINUED | OUTPATIENT
Start: 2023-11-07 | End: 2023-11-07 | Stop reason: HOSPADM

## 2023-11-07 RX ORDER — OXYCODONE HYDROCHLORIDE 5 MG/1
5 TABLET ORAL
Status: DISCONTINUED | OUTPATIENT
Start: 2023-11-07 | End: 2023-11-07 | Stop reason: HOSPADM

## 2023-11-07 RX ORDER — PROMETHAZINE HYDROCHLORIDE 25 MG/ML
6.25 INJECTION, SOLUTION INTRAMUSCULAR; INTRAVENOUS EVERY 6 HOURS PRN
Status: DISCONTINUED | OUTPATIENT
Start: 2023-11-07 | End: 2023-11-07

## 2023-11-07 RX ORDER — SODIUM CHLORIDE 9 MG/ML
INJECTION, SOLUTION INTRAVENOUS CONTINUOUS
Status: DISCONTINUED | OUTPATIENT
Start: 2023-11-07 | End: 2023-11-07 | Stop reason: HOSPADM

## 2023-11-07 RX ORDER — MIDAZOLAM HYDROCHLORIDE 1 MG/ML
INJECTION INTRAMUSCULAR; INTRAVENOUS PRN
Status: DISCONTINUED | OUTPATIENT
Start: 2023-11-07 | End: 2023-11-07 | Stop reason: SDUPTHER

## 2023-11-07 RX ORDER — PROPOFOL 10 MG/ML
INJECTION, EMULSION INTRAVENOUS PRN
Status: DISCONTINUED | OUTPATIENT
Start: 2023-11-07 | End: 2023-11-07 | Stop reason: SDUPTHER

## 2023-11-07 RX ORDER — OXYMETAZOLINE HYDROCHLORIDE 0.05 G/100ML
2 SPRAY NASAL
Status: COMPLETED | OUTPATIENT
Start: 2023-11-07 | End: 2023-11-07

## 2023-11-07 RX ORDER — SODIUM CHLORIDE 9 MG/ML
INJECTION, SOLUTION INTRAVENOUS PRN
Status: DISCONTINUED | OUTPATIENT
Start: 2023-11-07 | End: 2023-11-07 | Stop reason: HOSPADM

## 2023-11-07 RX ORDER — SODIUM CHLORIDE 0.9 % (FLUSH) 0.9 %
5-40 SYRINGE (ML) INJECTION PRN
Status: DISCONTINUED | OUTPATIENT
Start: 2023-11-07 | End: 2023-11-07 | Stop reason: HOSPADM

## 2023-11-07 RX ORDER — SCOLOPAMINE TRANSDERMAL SYSTEM 1 MG/1
1 PATCH, EXTENDED RELEASE TRANSDERMAL
Status: DISCONTINUED | OUTPATIENT
Start: 2023-11-07 | End: 2023-11-07 | Stop reason: HOSPADM

## 2023-11-07 RX ORDER — FENTANYL CITRATE 50 UG/ML
INJECTION, SOLUTION INTRAMUSCULAR; INTRAVENOUS PRN
Status: DISCONTINUED | OUTPATIENT
Start: 2023-11-07 | End: 2023-11-07 | Stop reason: SDUPTHER

## 2023-11-07 RX ORDER — ONDANSETRON 2 MG/ML
4 INJECTION INTRAMUSCULAR; INTRAVENOUS
Status: COMPLETED | OUTPATIENT
Start: 2023-11-07 | End: 2023-11-07

## 2023-11-07 RX ADMIN — HYDROMORPHONE HYDROCHLORIDE 0.5 MG: 2 INJECTION, SOLUTION INTRAMUSCULAR; INTRAVENOUS; SUBCUTANEOUS at 12:00

## 2023-11-07 RX ADMIN — CEFAZOLIN 2000 MG: 2 INJECTION, POWDER, FOR SOLUTION INTRAMUSCULAR; INTRAVENOUS at 10:37

## 2023-11-07 RX ADMIN — LABETALOL HYDROCHLORIDE 5 MG: 5 INJECTION, SOLUTION INTRAVENOUS at 11:38

## 2023-11-07 RX ADMIN — HYDROMORPHONE HYDROCHLORIDE 0.5 MG: 2 INJECTION, SOLUTION INTRAMUSCULAR; INTRAVENOUS; SUBCUTANEOUS at 12:41

## 2023-11-07 RX ADMIN — HYDROMORPHONE HYDROCHLORIDE 0.5 MG: 2 INJECTION, SOLUTION INTRAMUSCULAR; INTRAVENOUS; SUBCUTANEOUS at 13:31

## 2023-11-07 RX ADMIN — ONDANSETRON 4 MG: 2 INJECTION INTRAMUSCULAR; INTRAVENOUS at 13:45

## 2023-11-07 RX ADMIN — FENTANYL CITRATE 50 MCG: 50 INJECTION, SOLUTION INTRAMUSCULAR; INTRAVENOUS at 11:35

## 2023-11-07 RX ADMIN — SODIUM CHLORIDE: 9 INJECTION, SOLUTION INTRAVENOUS at 09:44

## 2023-11-07 RX ADMIN — HYDROMORPHONE HYDROCHLORIDE 0.5 MG: 2 INJECTION, SOLUTION INTRAMUSCULAR; INTRAVENOUS; SUBCUTANEOUS at 12:26

## 2023-11-07 RX ADMIN — ONDANSETRON 4 MG: 2 INJECTION INTRAMUSCULAR; INTRAVENOUS at 10:57

## 2023-11-07 RX ADMIN — MIDAZOLAM 2 MG: 1 INJECTION INTRAMUSCULAR; INTRAVENOUS at 10:55

## 2023-11-07 RX ADMIN — ROCURONIUM BROMIDE 50 MG: 10 INJECTION, SOLUTION INTRAVENOUS at 10:57

## 2023-11-07 RX ADMIN — FENTANYL CITRATE 50 MCG: 50 INJECTION, SOLUTION INTRAMUSCULAR; INTRAVENOUS at 11:30

## 2023-11-07 RX ADMIN — SODIUM CHLORIDE: 9 INJECTION, SOLUTION INTRAVENOUS at 11:42

## 2023-11-07 RX ADMIN — FENTANYL CITRATE 100 MCG: 50 INJECTION, SOLUTION INTRAMUSCULAR; INTRAVENOUS at 10:57

## 2023-11-07 RX ADMIN — LABETALOL HYDROCHLORIDE 5 MG: 5 INJECTION, SOLUTION INTRAVENOUS at 12:41

## 2023-11-07 RX ADMIN — LABETALOL HYDROCHLORIDE 5 MG: 5 INJECTION, SOLUTION INTRAVENOUS at 12:28

## 2023-11-07 RX ADMIN — SUGAMMADEX 200 MG: 100 INJECTION, SOLUTION INTRAVENOUS at 12:22

## 2023-11-07 RX ADMIN — ACETAMINOPHEN 1000 MG: 500 TABLET ORAL at 09:47

## 2023-11-07 RX ADMIN — LIDOCAINE HYDROCHLORIDE 100 MG: 20 INJECTION, SOLUTION INFILTRATION; PERINEURAL at 10:57

## 2023-11-07 RX ADMIN — NASAL DECONGESTANT 2 SPRAY: 0.05 SPRAY NASAL at 10:37

## 2023-11-07 RX ADMIN — DEXAMETHASONE SODIUM PHOSPHATE 8 MG: 4 INJECTION, SOLUTION INTRAMUSCULAR; INTRAVENOUS at 10:57

## 2023-11-07 RX ADMIN — PROPOFOL 250 MG: 10 INJECTION, EMULSION INTRAVENOUS at 10:57

## 2023-11-07 RX ADMIN — PROMETHAZINE HYDROCHLORIDE 6.25 MG: 25 INJECTION INTRAMUSCULAR; INTRAVENOUS at 14:17

## 2023-11-07 ASSESSMENT — PAIN SCALES - GENERAL
PAINLEVEL_OUTOF10: 2
PAINLEVEL_OUTOF10: 4
PAINLEVEL_OUTOF10: 0
PAINLEVEL_OUTOF10: 7
PAINLEVEL_OUTOF10: 4
PAINLEVEL_OUTOF10: 2

## 2023-11-07 ASSESSMENT — PAIN DESCRIPTION - LOCATION
LOCATION: THROAT
LOCATION: EAR;NOSE

## 2023-11-07 ASSESSMENT — ENCOUNTER SYMPTOMS: SHORTNESS OF BREATH: 0

## 2023-11-07 ASSESSMENT — PAIN DESCRIPTION - ORIENTATION: ORIENTATION: RIGHT;LEFT

## 2023-11-07 NOTE — PROGRESS NOTES
Drowsy but alert & oriented. Tolerating po ice chips. States pain is tolerable. Meets criteria for phase 2.

## 2023-11-07 NOTE — PROGRESS NOTES
Discharge instructions reviewed with pt and in [address] [place] [address] . Understanding verbalized. Written copy to pt.

## 2023-11-07 NOTE — BRIEF OP NOTE
Brief Postoperative Note      Patient: Tarah Harris  YOB: 1980  MRN: [id number]    Date of Procedure: 11/7/2023    Pre-Op Diagnosis Codes:     * Deviated nasal septum [J34.2]     * Hypertrophy of inferior nasal turbinate [J34.3]     * Refractory obstruction of nasal airway [J34.89]    Post-Op Diagnosis: Same       Procedure(s):  SEPTAL RECONSTRUCTION; INFERIOR TURBINATE REDUCTION    Surgeon(s):  Debo Lunsford MD    Assistant:  * No surgical staff found *    Anesthesia: General    Estimated Blood Loss (mL): 100 (one hundred)    Complications: None    Specimens:   ID Type Source Tests Collected by Time Destination   A : Portion of Right Inferior Turbinate Nasal Nose SURGICAL PATHOLOGY Debo Lunsford MD 11/7/2023 1158    B : Portion of Left inferior turbinate Nasal Nose SURGICAL PATHOLOGY Debo Lunsford MD 11/7/2023 1200        Implants:  * No implants in log *      Drains: * No LDAs found *    Findings:   Severe NSD leftward with large inferior and posterior spur to the left indenting the left IT and causing significant airway obstruction in the inferior and posterior area. Right IT hypertrophy causing significant right nasal airway obstruction. Deep creavice deforimty on the right. The cartilaginous and bony portions of the septum ser markedly thickeined an ddeformed . There was no straight thin enough portion of the central quadirilateral cartiblage to preserve or use as a cartilage gradt.          Electronically signed by Debo Lunsford MD on 11/7/2023 at 12:31 PM

## 2023-11-07 NOTE — DISCHARGE INSTRUCTIONS
[address] [facility] ENT    [place] [employer]. Taylor Jones M.D.  [address] [facility], [address] [place] [address], [address]  ([phone] [address] should be up and walking and moving about beginning the evening of surgery. Exercise caution due to the sedative effects (drowsiness, sleepiness) of your medications. You may resume normal nonstrenuous activity upon arrival home. When you rest in bed, elevate your head with your face up, on the day of surgery and the first day after surgery. Elevate your head at all times, using two or three pillows when lying down, for the first week after surgery. Please refrain from any strenuous physical activity or exercise for two weeks after surgery. DRAINING AND BLEEDING are normal after nasal surgery. Change your mustache dressing under the nose as it becomes soiled or soaked. Continue using a mustache dressing under the nose until nasal bleeding and/or drainage cease. If bleeding occurs, lie with the head elevated on two pillows. Put crushed ice in a plastic sandwich bag wrapped in cloth, such as a cotton lemuel-shirt, and place on the forehead. SWELLING of the upper lip and face are common after surgery. This will subside after a few days. RELTOK NASAL AIRWAY / NASAL PACKING  Nasal sponge packing, septal splints, and Relt[place] nasal airways were used. This nasal airways may allow near normal breathing through the nose. They will also act to vent and relieve pressure when swallowing. You may be able to breathe through your nose via the tubes. Watery eyes and nasal discomfort will subside once the packing, splints, and airways are removed by the doctor. DO NOT REMOVE THE PACKING YOURSELF. The nasal tubes you should be irrigated using the syringe and adapter in the packet you were provided after surgery.   Irrigate each tube with normal saline irrigation solution if you have that

## 2023-11-07 NOTE — PROGRESS NOTES
Awake alert & oriented but drowsy. Respirations easy & symmetrical. Nasal splints patent. Moustache dressing applied. No drainage noted. States pain is tolerable at 2/10. Relief from nausea. Assisted per w/c to restroom to void qs. Patient discharged home per w/c  to the care of the responsible party. No additional questions voiced related to discharge information. Patient discharged with all personal items.

## 2023-11-07 NOTE — H&P
Date of Surgery Update:  Saintclair Aurora was seen, history and physical examination reviewed, and patient examined by me today. There have been no significant clinical changes since the completion of the previous history and physical.    The risk, benefits, and alternatives of the proposed procedure have been explained to the patient (or appropriate guardian) and understanding verbalized. All questions answered. Patient wishes to proceed.     Electronically signed by: Isabella Cassidy MD,11/7/2023,10:52 AM

## 2023-11-07 NOTE — PROGRESS NOTES
Patient arrived from OR to PACU. Oral airway in place. On 6 L simple mask. NSR on the monitor. VSS. Airway tubes in bilateral nares. Ice applied.

## 2023-11-07 NOTE — OP NOTE
Operative Note      Patient: Tomasz Bnaks  YOB: 1980  MRN: [id number]    Date of Procedure: 11/7/2023    Pre-Op Diagnosis Codes:     * Deviated nasal septum [J34.2]     * Hypertrophy of inferior nasal turbinate [J34.3]     * Refractory obstruction of nasal airway [J34.89]    Post-Op Diagnosis: Same       Procedure(s):  SEPTAL RECONSTRUCTION; INFERIOR TURBINATE REDUCTION    Surgeon(s):  Jamee Ennsi MD    Assistant:   * No surgical staff found *    Anesthesia: General    Estimated Blood Loss (mL): 100 (one hundred)    Complications: None    Specimens:   ID Type Source Tests Collected by Time Destination   A : Portion of Right Inferior Turbinate Nasal Nose SURGICAL PATHOLOGY Jamee Ennis MD 11/7/2023 1158    B : Portion of Left inferior turbinate Nasal Nose SURGICAL PATHOLOGY Jamee Ennis MD 11/7/2023 1200        Implants:  * No implants in log *      Drains: * No LDAs found *      Findings: ***>>>      Detailed Description of Procedure:   *** >>>    Electronically signed by Jamee Ennis MD on 11/7/2023 at 12:36 PM

## 2023-11-07 NOTE — ANESTHESIA POSTPROCEDURE EVALUATION
Department of Anesthesiology  Postprocedure Note    Patient: Grace Leigh  MRN: [id number]  YOB: 1980  Date of evaluation: 11/7/2023      Procedure Summary     Date: 11/07/23 Room / Location: [address]    Anesthesia Start: 8091 Anesthesia Stop: 1246    Procedure: SEPTAL RECONSTRUCTION; INFERIOR TURBINATE REDUCTION (Bilateral: Nose) Diagnosis:       Deviated nasal septum      Hypertrophy of inferior nasal turbinate      Refractory obstruction of nasal airway      (Deviated nasal septum [J34.2])      (Hypertrophy of inferior nasal turbinate [J34.3])      (Refractory obstruction of nasal airway [J34.89])    Surgeons: Ladora Ganser, MD Responsible Provider: Rosalee Silverio MD    Anesthesia Type: general ASA Status: 2          Anesthesia Type: No value filed.     Mariluz Phase I: Mariluz Score: 7    Mariluz Phase II:        Anesthesia Post Evaluation    Patient location during evaluation: PACU  Patient participation: complete - patient participated  Level of consciousness: awake  Airway patency: patent  Nausea & Vomiting: no nausea and no vomiting  Complications: no  Cardiovascular status: hemodynamically stable  Respiratory status: acceptable  Hydration status: stable  Multimodal analgesia pain management approach  Pain management: adequate

## 2023-11-07 NOTE — PROGRESS NOTES
Report received from Billie N Víctor Singleton. Pt drowsy but awakens to name call. Bilateral nasal splints patent with strings steristripped to nose. Ice pack to forehead. VSS.  Pain level 2/10

## 2023-11-07 NOTE — H&P
Date of Surgery Update:  Bernardo Dominguez was seen, history and physical examination reviewed, and patient examined by me today. There have been no significant clinical changes since the completion of the previous history and physical.    The risk, benefits, and alternatives of the proposed procedure have been explained to the patient (or appropriate guardian) and understanding verbalized. All questions answered. Patient wishes to proceed.     Electronically signed by: Alicia Wang MD,11/7/2023,10:26 AM

## 2023-11-10 ENCOUNTER — OFFICE VISIT (OUTPATIENT)
Dept: ENT CLINIC | Age: 43
End: 2023-11-10

## 2023-11-10 VITALS
HEIGHT: 64 IN | SYSTOLIC BLOOD PRESSURE: 144 MMHG | TEMPERATURE: 97.5 F | HEART RATE: 92 BPM | OXYGEN SATURATION: 96 % | DIASTOLIC BLOOD PRESSURE: 92 MMHG | WEIGHT: 212 LBS | BODY MASS INDEX: 36.19 KG/M2

## 2023-11-10 DIAGNOSIS — Z09 POSTOP CHECK: Primary | ICD-10-CM

## 2023-11-10 PROCEDURE — 99024 POSTOP FOLLOW-UP VISIT: CPT | Performed by: OTOLARYNGOLOGY

## 2023-11-14 ENCOUNTER — TELEMEDICINE (OUTPATIENT)
Dept: BARIATRICS/WEIGHT MGMT | Age: 43
End: 2023-11-14
Payer: OTHER GOVERNMENT

## 2023-11-14 DIAGNOSIS — Z71.3 DIETARY COUNSELING AND SURVEILLANCE: ICD-10-CM

## 2023-11-14 DIAGNOSIS — E66.9 CLASS 2 OBESITY: Primary | ICD-10-CM

## 2023-11-14 PROCEDURE — 99213 OFFICE O/P EST LOW 20 MIN: CPT | Performed by: FAMILY MEDICINE

## 2023-11-14 ASSESSMENT — ENCOUNTER SYMPTOMS
DIARRHEA: 0
EYE PAIN: 0
BLOOD IN STOOL: 0
ABDOMINAL PAIN: 0
SHORTNESS OF BREATH: 0
VOMITING: 0
WHEEZING: 0
APNEA: 0
ABDOMINAL DISTENTION: 0
NAUSEA: 0
PHOTOPHOBIA: 0
COUGH: 0
CONSTIPATION: 0
CHEST TIGHTNESS: 0
CHOKING: 0

## 2023-11-15 ENCOUNTER — OFFICE VISIT (OUTPATIENT)
Dept: ENT CLINIC | Age: 43
End: 2023-11-15

## 2023-11-15 VITALS
BODY MASS INDEX: 36.84 KG/M2 | HEART RATE: 88 BPM | SYSTOLIC BLOOD PRESSURE: 126 MMHG | HEIGHT: 64 IN | TEMPERATURE: 97.8 F | WEIGHT: 215.8 LBS | DIASTOLIC BLOOD PRESSURE: 87 MMHG | OXYGEN SATURATION: 96 %

## 2023-11-15 DIAGNOSIS — Z09 POSTOP CHECK: Primary | ICD-10-CM

## 2023-11-15 PROCEDURE — 99024 POSTOP FOLLOW-UP VISIT: CPT | Performed by: OTOLARYNGOLOGY

## 2023-11-27 ENCOUNTER — TELEPHONE (OUTPATIENT)
Dept: ENT CLINIC | Age: 43
End: 2023-11-27

## 2023-11-27 NOTE — TELEPHONE ENCOUNTER
Pt calling in stating possible sinus infection -- she had sinus surgery with you at the beginning of November and wants to know if you would like to see her in the office or have her go to her PCP. Please advise. Thank you.

## 2023-12-01 NOTE — TELEPHONE ENCOUNTER
Phone call. Reached patient's voicemail and left a message that I was calling regarding her previous message. I advised her to call the office if she is still having symptoms and if she did not get into see her primary care physician and get treated for sinus infection. Otherwise I will see her at the next follow-up visit on 12/18/2023.

## 2024-03-18 DIAGNOSIS — H69.93 ETD (EUSTACHIAN TUBE DYSFUNCTION), BILATERAL: ICD-10-CM

## 2024-03-18 RX ORDER — FEXOFENADINE HCL 180 MG/1
180 TABLET ORAL DAILY
Qty: 90 TABLET | Refills: 3 | OUTPATIENT
Start: 2024-03-18

## 2024-04-04 DIAGNOSIS — H69.93 ETD (EUSTACHIAN TUBE DYSFUNCTION), BILATERAL: ICD-10-CM

## 2024-04-05 RX ORDER — FEXOFENADINE HCL 180 MG/1
180 TABLET ORAL DAILY
Qty: 90 TABLET | Refills: 1 | OUTPATIENT
Start: 2024-04-05 | End: 2024-10-02

## 2024-04-15 DIAGNOSIS — H69.93 ETD (EUSTACHIAN TUBE DYSFUNCTION), BILATERAL: ICD-10-CM

## 2024-04-15 RX ORDER — FLUTICASONE PROPIONATE 50 MCG
SPRAY, SUSPENSION (ML) NASAL
Qty: 48 G | Refills: 3 | OUTPATIENT
Start: 2024-04-15

## 2024-05-22 ENCOUNTER — OFFICE VISIT (OUTPATIENT)
Dept: ENT CLINIC | Age: 44
End: 2024-05-22
Payer: OTHER GOVERNMENT

## 2024-05-22 VITALS
HEIGHT: 64 IN | TEMPERATURE: 98.1 F | BODY MASS INDEX: 39.09 KG/M2 | SYSTOLIC BLOOD PRESSURE: 116 MMHG | DIASTOLIC BLOOD PRESSURE: 80 MMHG | OXYGEN SATURATION: 97 % | HEART RATE: 73 BPM | WEIGHT: 229 LBS

## 2024-05-22 DIAGNOSIS — H90.12 CONDUCTIVE HEARING LOSS OF LEFT EAR WITH UNRESTRICTED HEARING OF RIGHT EAR: Chronic | ICD-10-CM

## 2024-05-22 DIAGNOSIS — Z96.22 PATENT PRESSURE EQUALIZATION (PE) TUBE: ICD-10-CM

## 2024-05-22 DIAGNOSIS — H69.93 ETD (EUSTACHIAN TUBE DYSFUNCTION), BILATERAL: Primary | Chronic | ICD-10-CM

## 2024-05-22 PROCEDURE — 99212 OFFICE O/P EST SF 10 MIN: CPT | Performed by: OTOLARYNGOLOGY

## 2024-05-22 NOTE — PROGRESS NOTES
[facility]       Chief Complaint   Patient presents with    Ear Problem       HISTORY OF PRESENT ILLNESS:   Marge is a 44 y.o. female here for recheck of ears and PE tubes.  Bilateral myringotomy and PE tubes 6/28/2022.  No ear pain or drainage.  Hearing well.      REVIEW OF SYSTEMS:   Constitutional:  Denied fever or chills.     Ears, Nose Throat:  Denied otalgia, otorrhea, and sore throat.          EXAMINATION:    Constitutional:  Vitals signs reviewed, normal.  Vitals:    05/22/24 1102   BP: 116/80   Pulse: 73   Temp: 98.1 °F (36.7 °C)   SpO2: 97%       General appearance: Well developed, well nourished.    Ears:  Otomicroscopic exam:  The TMs were normalized with patent PE tubes, in place with clear unobstructed lumens  The TMs were not mobile to pneumatic massage consistent with patent PE tubes.  The EACs were normal bilaterally.   External ears:  Normal.      IMPRESSION / DIAGNOSES / ORDERS:   Marge was seen today for ear problem.    Diagnoses and all orders for this visit:    ETD (Eustachian tube dysfunction), bilateral  -     [facility]    Conductive hearing loss of left ear with unrestricted hearing of right ear  -     [facility]    Patent pressure equalization (PE) tube  -     [facility]         RECOMMENDATIONS / PLAN:   Audiogram now.  See Patient Instructions on file for this visit.  Return in about 6 months (around 11/22/2024) for recheck/follow-up, and sooner if any ENT problems or if condition worsens.

## 2024-05-29 ENCOUNTER — PROCEDURE VISIT (OUTPATIENT)
Dept: AUDIOLOGY | Age: 44
End: 2024-05-29
Payer: OTHER GOVERNMENT

## 2024-05-29 DIAGNOSIS — H90.A32 MIXED CONDUCTIVE AND SENSORINEURAL HEARING LOSS OF LEFT EAR WITH RESTRICTED HEARING OF RIGHT EAR: Primary | ICD-10-CM

## 2024-05-29 DIAGNOSIS — H90.A21 SENSORINEURAL HEARING LOSS (SNHL) OF RIGHT EAR WITH RESTRICTED HEARING OF LEFT EAR: ICD-10-CM

## 2024-05-29 PROCEDURE — 92557 COMPREHENSIVE HEARING TEST: CPT

## 2024-05-29 PROCEDURE — 92567 TYMPANOMETRY: CPT

## 2024-05-29 NOTE — PROGRESS NOTES
Marge Bell   1980, 44 y.o. female   [id number]       Referring Provider: Roe Tucker MD  Referral Type: In an order in Epic    Reason for Visit: Evaluation of suspected change in hearing, tinnitus, or balance.    ADULT AUDIOLOGIC EVALUATION      Marge Bell is a 44 y.o. female seen today, 5/29/2024 , for a recheck audiologic evaluation.      AUDIOLOGIC AND OTHER PERTINENT MEDICAL HISTORY:      Marge Bell reports no hearing improvements since obtaining bilateral PE tubes in 2022. She notes difficulty hearing in background noise and softer voices. Notes having chronic ear infections starting at college age and ears have not been the same since. .     She denied otalgia, aural fullness, otorrhea, tinnitus, dizziness, imbalance, history of falls, history of noise exposure, history of head trauma, and family history of hearing loss    Date: 5/29/2024     IMPRESSIONS:      Today's results revealed mixed conductive and sensorineural hearing loss in the left, essentially normal hearing sensitivity in the right. Thresholds have improved bilaterally since last audiogram in 2022.Excellent speech understanding when in quiet, bilaterally. Tympanometry indicates large ear canal volume consistent with perforated ear drum/tympanic membrane. Discussed test results and implications with patient. Discussed possible benefits of amplification.  Discussed scheduling a HAE.  Follow medical recommendations of Roe Tucker MD.     ASSESSMENT AND FINDINGS:     Otoscopy revealed bilateral patent PE tubes .    RIGHT EAR:  Hearing Sensitivity:Hearing sensitivity essentially within normal limits from 250-8000 Hz  Speech Recognition Threshold: 10 dB HL  Word Recognition: Excellent 100%, based on NU-6 25-word list at 50 dBHL using recorded speech stimuli.    Tympanometry: Flat, no peak pressure or compliance, Type B tympanogram, with large ear canal volume, consistent with patent PE tube/TM perforation.       LEFT

## 2024-05-29 NOTE — PATIENT INSTRUCTIONS
alert you to the doorbell, a ringing telephone, or a baby monitor.  Television closed-captioning. This shows the words at the bottom of the screen. Most new TVs can do this.  TTY (text telephone). This lets you type messages back and forth on the telephone instead of talking or listening. These devices are also called TDD. When messages are typed on the keyboard, they are sent over the phone line to a receiving TTY. The message is shown on a monitor.  Use pagers, fax machines, text, and email if it is hard for you to communicate by telephone.  Try to learn a listening technique called speech-reading. It is not lip-reading. You pay attention to people's gestures, expressions, posture, and tone of voice. These clues can help you understand what a person is saying. Face the person you are talking to, and have him or her face you. Make sure the lighting is good. You need to see the other person's face clearly.  Think about counseling if you need help to adjust to your hearing loss.    When should you call for help?  Watch closely for changes in your health, and be sure to contact your doctor if:    You think your hearing is getting worse.     You have new symptoms, such as dizziness or nausea.          Good Communication Strategies    Communication can be challenging for anyone, but can be especially difficult for those with some degree of hearing loss.  While we may not be able to control every factor that may lead to difficulty with communication, there are Good Communication Strategies that we can all use in our day-to-day lives.  Communication takes both parties working together for it to be successful.    Tips as a Listener:   Control your environment.  It is important to limit the amount of background noise in the room when possible.  You should also consider having a good light source in the room to best see the other person.  Ask for clarification.  Instead of saying \"What?\", you can use parts of what you heard

## 2024-06-11 ENCOUNTER — PROCEDURE VISIT (OUTPATIENT)
Dept: AUDIOLOGY | Age: 44
End: 2024-06-11

## 2024-06-11 ENCOUNTER — PATIENT MESSAGE (OUTPATIENT)
Dept: ENT CLINIC | Age: 44
End: 2024-06-11

## 2024-06-11 DIAGNOSIS — H90.A32 MIXED CONDUCTIVE AND SENSORINEURAL HEARING LOSS OF LEFT EAR WITH RESTRICTED HEARING OF RIGHT EAR: Primary | ICD-10-CM

## 2024-06-11 DIAGNOSIS — H69.93 ETD (EUSTACHIAN TUBE DYSFUNCTION), BILATERAL: ICD-10-CM

## 2024-06-11 DIAGNOSIS — H90.A21 SENSORINEURAL HEARING LOSS (SNHL) OF RIGHT EAR WITH RESTRICTED HEARING OF LEFT EAR: ICD-10-CM

## 2024-06-11 NOTE — TELEPHONE ENCOUNTER
Refill request for Flonase and Allegra, last refilled on 10/16/23, last OV 5/22/24, please advise

## 2024-06-11 NOTE — TELEPHONE ENCOUNTER
From: Marge Bell  To: Dr. Roe Tucker  Sent: 6/11/2024 7:43 AM EDT  Subject: Medication refill?    Good morning! I wanted to see if it would be possible to have refills for Flonase and Allegra submitted to my pharmacy, [facility]? Thank you!  Marge

## 2024-06-13 RX ORDER — FLUTICASONE PROPIONATE 50 MCG
SPRAY, SUSPENSION (ML) NASAL
Qty: 48 G | Refills: 0 | Status: SHIPPED | OUTPATIENT
Start: 2024-06-13

## 2024-06-13 RX ORDER — FEXOFENADINE HCL 180 MG/1
180 TABLET ORAL DAILY
Qty: 90 TABLET | Refills: 0 | Status: SHIPPED | OUTPATIENT
Start: 2024-06-13 | End: 2024-12-10

## 2024-06-24 ENCOUNTER — HOSPITAL ENCOUNTER (OUTPATIENT)
Dept: MAMMOGRAPHY | Age: 44
Discharge: HOME | End: 2024-06-24
Payer: OTHER GOVERNMENT

## 2024-06-24 DIAGNOSIS — Z12.31 ENCOUNTER FOR SCREENING MAMMOGRAM FOR BREAST CANCER: ICD-10-CM

## 2024-06-24 PROCEDURE — 77063 BREAST TOMOSYNTHESIS BI: CPT

## 2024-07-03 ENCOUNTER — TELEPHONE (OUTPATIENT)
Dept: SURGERY | Age: 44
End: 2024-07-03

## 2024-07-03 NOTE — TELEPHONE ENCOUNTER
Called pt Lm on vm regarding HR form sent   From [facility] to call back to schedule   Appt. Called on 7/3/2024

## 2024-07-11 NOTE — PROGRESS NOTES
Marge Bell   1980, 44 y.o. female  [id number]       HEARING AID EVALUATION    Marge Bell was seen today, 6/11/2024 for a Hearing Aid Evaluation.  Patient has no prior history of hearing aid use.  Otoscopy revealed: Clear ear canals bilaterally.  A pair of demo Phonak Audeo L90-RL hearing aids were programmed and used in today's appointment.    INSURANCE:  Her insurance was contacted and she does not have a benefit for hearing aids (please see scanned insurance verification worksheet in Media tab).  Discussed possible other avenues for hearing aids per , such as the [facility].    LIFESTYLE EVALUATION:       Primary listening situations Marge Bell would like to improve:  Conversations at work  Conversations at home with her family  Hearing when not looking directly at someone    Other pertinent lifestyle needs (employment history, family history, hobbies/activities): She has instances at work when others are near her left side, and it makes conversation challenging.    Phone connectivity:  Discussed chioma and streaming capabilities    Interested in rechargeable    DEVICE SELECTION:       After a discussion of the various styles, technology levels, and features of available in hearing aid technology in relation to his lifestyle needs, Marge Bell has decided to consider the options and contact Audiology when a decision has been made..      Technology to be considered: (1) LEFT Oticon Intent minIRITE-R, (1 or 2) 1-60 dB , 6mm open bass dome, color 93      Patient cost due at time of fitting: $TBD, quoted $2537.25 for premium and $1987.25 for advanced.    PATIENT EDUCATION:         - Verbally reviewed various styles, technology levels, and features of available hearing aid technology and realistic expectations with amplification.   - Patient was provided with a brochure for Oticon Intent hearing aids.    RECOMMENDATIONS:        - Contact Audiology when a decision has been made to

## 2024-09-13 DIAGNOSIS — H69.93 ETD (EUSTACHIAN TUBE DYSFUNCTION), BILATERAL: ICD-10-CM

## 2024-09-14 RX ORDER — FEXOFENADINE HCL 180 MG/1
TABLET ORAL
Qty: 75 TABLET | Refills: 0 | Status: SHIPPED | OUTPATIENT
Start: 2024-09-14

## 2024-09-14 RX ORDER — FLUTICASONE PROPIONATE 50 MCG
SPRAY, SUSPENSION (ML) NASAL
Qty: 32 G | Refills: 0 | Status: SHIPPED | OUTPATIENT
Start: 2024-09-14

## 2024-11-13 DIAGNOSIS — H69.93 ETD (EUSTACHIAN TUBE DYSFUNCTION), BILATERAL: ICD-10-CM

## 2024-11-13 RX ORDER — FEXOFENADINE HCL 180 MG/1
TABLET ORAL
Qty: 75 TABLET | Refills: 3 | OUTPATIENT
Start: 2024-11-13

## 2024-11-13 RX ORDER — FLUTICASONE PROPIONATE 50 MCG
SPRAY, SUSPENSION (ML) NASAL
Qty: 32 G | Refills: 5 | OUTPATIENT
Start: 2024-11-13

## 2024-11-25 ENCOUNTER — OFFICE VISIT (OUTPATIENT)
Dept: ENT CLINIC | Age: 44
End: 2024-11-25
Payer: OTHER GOVERNMENT

## 2024-11-25 VITALS
DIASTOLIC BLOOD PRESSURE: 78 MMHG | HEART RATE: 92 BPM | OXYGEN SATURATION: 98 % | TEMPERATURE: 97.7 F | SYSTOLIC BLOOD PRESSURE: 118 MMHG | BODY MASS INDEX: 40.63 KG/M2 | HEIGHT: 64 IN | WEIGHT: 238 LBS

## 2024-11-25 DIAGNOSIS — H69.93 ETD (EUSTACHIAN TUBE DYSFUNCTION), BILATERAL: ICD-10-CM

## 2024-11-25 DIAGNOSIS — Z96.22 PATENT PRESSURE EQUALIZATION (PE) TUBE: ICD-10-CM

## 2024-11-25 DIAGNOSIS — J30.89 ACUTE NON-SEASONAL ALLERGIC RHINITIS: Primary | ICD-10-CM

## 2024-11-25 DIAGNOSIS — H90.12 CONDUCTIVE HEARING LOSS OF LEFT EAR WITH UNRESTRICTED HEARING OF RIGHT EAR: ICD-10-CM

## 2024-11-25 PROCEDURE — 99213 OFFICE O/P EST LOW 20 MIN: CPT | Performed by: OTOLARYNGOLOGY

## 2024-11-25 RX ORDER — FEXOFENADINE HCL 180 MG/1
180 TABLET ORAL DAILY
Qty: 90 TABLET | Refills: 1 | Status: SHIPPED | OUTPATIENT
Start: 2024-11-25 | End: 2025-05-24

## 2024-11-25 RX ORDER — FLUTICASONE PROPIONATE 50 MCG
SPRAY, SUSPENSION (ML) NASAL
Qty: 48 G | Refills: 1 | Status: SHIPPED | OUTPATIENT
Start: 2024-11-25

## 2024-11-25 NOTE — PROGRESS NOTES
CHIEF COMPLAINT  Chief Complaint   Patient presents with    Ear Problem       HISTORY OF PRESENT ILLNESS   Marge Bell is a 44 y.o. female here for recheck and follow up of ears and PE tubes.  Bilateral myringotomy and PE tubes 6/28/2022.  No ear pain or drainage.  Hearing well.       REVIEW OF SYSTEMS  Constitutional:  Denied fever and chills.  ENT/sinus:  Denied otalgia, otorrhea, nasal pain, rhinorrhea, sore throat, and sinus/facial pain.        EXAMINATION    Vitals:    11/25/24 1008   BP: 118/78   Site: Left Upper Arm   Position: Sitting   Cuff Size: Large Adult   Pulse: 92   Temp: 97.7 °F (36.5 °C)   TempSrc: Infrared   SpO2: 98%   Weight: 108 kg (238 lb)   Height: 1.626 m (5' 4\")     WDWN, NAD  Face:  Normal skin.    Voice:  Normal, with no hoarseness, breathiness, or hot potato quality.  (+) Ears:  Patent PE tubes in TMs which are normal otherwise,and immobile to pneumatic massage consistent with patent PE tubes.  Able to see down the lumen of the right tube but not the left due to the angle of the position.  Mild non-obstructive cerumen accumulation was removed from the left EAC with a wire loop..  The EACs were normal.  The mastoids and pinnae were normal.  Binaural binocular otomicroscopy was performed.    (+) Nose:  mild NSD leftward but essentially midlinw, otherwise, normal bilaterally  (+) OC/OP:  tonsils 3+ with no erythema or exudate bilaterally, otherwise, normal.  Neck/Nodes:  no masses or tenderness or cervical lymphadenopathy.    I performed this physical exam personally.        IMPRESSION / DIAGNOSES / ORDERS:   Marge was seen today for ear problem.    Diagnoses and all orders for this visit:    Acute non-seasonal allergic rhinitis  -     fluticasone (FLONASE) 50 MCG/ACT nasal spray; USE 2 SPRAYS IN EACH NOSTRIL DAILY FOR ALLERGIES  -     fexofenadine (ALLEGRA) 180 MG tablet; Take 1 tablet by mouth daily FOR ALLERGIES    ETD (Eustachian tube dysfunction), bilateral  Comments:  Treated

## 2025-01-15 ENCOUNTER — OFFICE VISIT (OUTPATIENT)
Dept: BARIATRICS/WEIGHT MGMT | Age: 45
End: 2025-01-15

## 2025-01-15 VITALS
HEIGHT: 64 IN | WEIGHT: 241.4 LBS | OXYGEN SATURATION: 96 % | SYSTOLIC BLOOD PRESSURE: 136 MMHG | BODY MASS INDEX: 41.21 KG/M2 | RESPIRATION RATE: 18 BRPM | DIASTOLIC BLOOD PRESSURE: 86 MMHG | HEART RATE: 96 BPM

## 2025-01-15 DIAGNOSIS — E66.01 MORBID OBESITY WITH BMI OF 40.0-44.9, ADULT: Primary | ICD-10-CM

## 2025-01-15 NOTE — PROGRESS NOTES
Marge Bell is a 44 y.o. female with a date of birth of 1980.    Vitals:    01/15/25 1228   BP: 136/86   Pulse: 96   Resp: 18   SpO2: 96%   Weight: 109.5 kg (241 lb 6.4 oz)   Height: 1.626 m (5' 4\")    BMI: Body mass index is 41.44 kg/m². Obesity Classification: Class III    Weight History:   Wt Readings from Last 3 Encounters:   01/15/25 109.5 kg (241 lb 6.4 oz)   11/25/24 108 kg (238 lb)   05/22/24 103.9 kg (229 lb)       Pt attended Medical Weight Management Seminar. Patient was educated on low-carb diet protocol. Nutrition and habit guidelines were discussed and written information was provided. Bariatric Nutrition Questionnaire completed during class and scanned into media.       Goals  Weight: 160-170  Health Improvement: improve energy, sleep and reduce health risks    Assessment  Nutritional Needs: RMR=(9.99 x 109.5) + (6.25 x 162.6) - (4.92 x 44 y.o.) -161  = 1728 kcal x 1.3 (sedentary activity factor)= 2246 kcal - 1000 (for 2 lb weight loss/week)= 1246 kcal.    Patient has participated in the following weight loss programs: calorie restriction.   Patient has not participated in meal replacement/liquid diets.  Patient has not participated in weight loss medications.  Patient does not have history of bariatric surgery.     Pt is not currently exercising. Pt reports eating fast and eating out of stress/emotions.    Plan  Plan/Recommendations:  1930-6730 tez LC MP  Optifast:  Pt interested in  Diet Medications:  Pt interested in  Bariatric Surgery:  Pt not interested in  1:1 RD Visit:  Pt interested in    PES Statement: Overweight/Obesity related to lack of exercise, sedentary lifestyle, unhealthy eating habits, and unsuccessful diet attempts as evidenced by BMI. Body mass index is 41.44 kg/m².    Handouts: cravings, protein shakes, protein bars, tracking, frozen meals, new patient packet     Will follow up as necessary.    Elise Orourke, RD, LD

## 2025-01-16 ENCOUNTER — TELEMEDICINE (OUTPATIENT)
Dept: BARIATRICS/WEIGHT MGMT | Age: 45
End: 2025-01-16

## 2025-01-16 DIAGNOSIS — Z71.3 DIETARY COUNSELING AND SURVEILLANCE: ICD-10-CM

## 2025-01-16 DIAGNOSIS — E66.01 MORBID OBESITY WITH BMI OF 40.0-44.9, ADULT: Primary | ICD-10-CM

## 2025-01-16 ASSESSMENT — ENCOUNTER SYMPTOMS
BLOOD IN STOOL: 0
ABDOMINAL DISTENTION: 0
ABDOMINAL PAIN: 0
DIARRHEA: 0
PHOTOPHOBIA: 0
SHORTNESS OF BREATH: 0
EYE PAIN: 0
VOMITING: 0
CHEST TIGHTNESS: 0
NAUSEA: 0
CONSTIPATION: 0
WHEEZING: 0
COUGH: 0
APNEA: 0
CHOKING: 0

## 2025-01-16 NOTE — PROGRESS NOTES
Patient: Marge Bell                      Encounter Date: 2025    YOB: 1980               Age: 44 y.o.    Chief Complaint   Patient presents with    Weight Management     F/u MWM       Patient identification was verified at the start of the visit.         1/15/2025     9:39 PM   Patient-Reported Vitals   Patient-Reported Weight 241   Patient-Reported Height 5’4”         BP Readings from Last 1 Encounters:   01/15/25 136/86       BMI Readings from Last 1 Encounters:   01/15/25 41.44 kg/m²       Pulse Readings from Last 1 Encounters:   01/15/25 96          Wt Readings from Last 3 Encounters:   01/15/25 109.5 kg (241 lb 6.4 oz)   24 108 kg (238 lb)   24 103.9 kg (229 lb)        HPI: 44 y.o. female with a long-standing history of obesity presents today for a virtual video follow-up. She has gained 16 pounds since her last visit in 2023. She is motivated to get back on track and start losing weight. Open to either Optifast or aom.       Allergies   Allergen Reactions    Ryidxzjg-Acmspeu-Pohmzy [Fluocinolone] Nausea And Vomiting    Ciprofloxacin Nausea And Vomiting         Current Outpatient Medications:     fluticasone (FLONASE) 50 MCG/ACT nasal spray, USE 2 SPRAYS IN EACH NOSTRIL DAILY FOR ALLERGIES, Disp: 48 g, Rfl: 1    fexofenadine (ALLEGRA) 180 MG tablet, Take 1 tablet by mouth daily FOR ALLERGIES, Disp: 90 tablet, Rfl: 1    omeprazole (PRILOSEC) 10 MG delayed release capsule, Take 1 capsule by mouth daily, Disp: , Rfl:     desvenlafaxine succinate (PRISTIQ) 100 MG TB24 extended release tablet, Take 1 tablet by mouth daily, Disp: , Rfl:     Etonogestrel-Ethinyl Estradiol (NUVARING [facility]), Place vaginally, Disp: , Rfl:     Patient Active Problem List   Diagnosis     (spontaneous vaginal delivery)    ETD (Eustachian tube dysfunction), bilateral    Conductive hearing loss of left ear with restricted hearing of right ear    Otalgia of left ear    Middle ear atelectasis, left

## 2025-02-11 ENCOUNTER — HOSPITAL ENCOUNTER (OUTPATIENT)
Age: 45
Discharge: HOME | End: 2025-02-11
Payer: OTHER GOVERNMENT

## 2025-02-11 DIAGNOSIS — E66.01 MORBID OBESITY WITH BMI OF 40.0-44.9, ADULT: ICD-10-CM

## 2025-02-11 LAB
25(OH)D3 SERPL-MCNC: 13.1 NG/ML
ALBUMIN SERPL-MCNC: 4 G/DL (ref 3.4–5)
ALBUMIN/GLOB SERPL: 1.1 {RATIO} (ref 1.1–2.2)
ALP SERPL-CCNC: 152 U/L (ref 40–129)
ALT SERPL-CCNC: 24 U/L (ref 10–40)
ANION GAP SERPL CALCULATED.3IONS-SCNC: 10 MMOL/L (ref 3–16)
AST SERPL-CCNC: 24 U/L (ref 15–37)
BILIRUB SERPL-MCNC: 0.5 MG/DL (ref 0–1)
BUN SERPL-MCNC: 8 MG/DL (ref 7–20)
CALCIUM SERPL-MCNC: 9 MG/DL (ref 8.3–10.6)
CHLORIDE SERPL-SCNC: 103 MMOL/L (ref 99–110)
CHOLEST SERPL-MCNC: 180 MG/DL (ref 0–199)
CO2 SERPL-SCNC: 26 MMOL/L (ref 21–32)
CREAT SERPL-MCNC: 0.7 MG/DL (ref 0.6–1.1)
DEPRECATED RDW RBC AUTO: 13.7 % (ref 12.4–15.4)
EST. AVERAGE GLUCOSE BLD GHB EST-MCNC: 102.5 MG/DL
FOLATE SERPL-MCNC: 28.2 NG/ML (ref 4.78–24.2)
GFR SERPLBLD CREATININE-BSD FMLA CKD-EPI: >90 ML/MIN/{1.73_M2}
GLUCOSE SERPL-MCNC: 88 MG/DL (ref 70–99)
HBA1C MFR BLD: 5.2 %
HCT VFR BLD AUTO: 39 % (ref 36–48)
HDLC SERPL-MCNC: 41 MG/DL (ref 40–60)
HGB BLD-MCNC: 13 G/DL (ref 12–16)
LDLC SERPL CALC-MCNC: 118 MG/DL
MCH RBC QN AUTO: 27.8 PG (ref 26–34)
MCHC RBC AUTO-ENTMCNC: 33.4 G/DL (ref 31–36)
MCV RBC AUTO: 83.4 FL (ref 80–100)
PLATELET # BLD AUTO: 260 K/UL (ref 135–450)
PMV BLD AUTO: 9.3 FL (ref 5–10.5)
POTASSIUM SERPL-SCNC: 3.6 MMOL/L (ref 3.5–5.1)
PROT SERPL-MCNC: 7.6 G/DL (ref 6.4–8.2)
RBC # BLD AUTO: 4.68 M/UL (ref 4–5.2)
SODIUM SERPL-SCNC: 139 MMOL/L (ref 136–145)
TRIGL SERPL-MCNC: 107 MG/DL (ref 0–150)
TSH SERPL DL<=0.005 MIU/L-ACNC: 1.46 UIU/ML (ref 0.27–4.2)
VIT B12 SERPL-MCNC: 2305 PG/ML (ref 211–911)
VLDLC SERPL CALC-MCNC: 21 MG/DL
WBC # BLD AUTO: 6.2 K/UL (ref 4–11)

## 2025-02-11 PROCEDURE — 83036 HEMOGLOBIN GLYCOSYLATED A1C: CPT

## 2025-02-11 PROCEDURE — 36415 COLL VENOUS BLD VENIPUNCTURE: CPT

## 2025-02-11 PROCEDURE — 85027 COMPLETE CBC AUTOMATED: CPT

## 2025-02-11 PROCEDURE — 82607 VITAMIN B-12: CPT

## 2025-02-11 PROCEDURE — 80053 COMPREHEN METABOLIC PANEL: CPT

## 2025-02-11 PROCEDURE — 82746 ASSAY OF FOLIC ACID SERUM: CPT

## 2025-02-11 PROCEDURE — 80061 LIPID PANEL: CPT

## 2025-02-11 PROCEDURE — 84443 ASSAY THYROID STIM HORMONE: CPT

## 2025-02-11 PROCEDURE — 82306 VITAMIN D 25 HYDROXY: CPT

## 2025-02-27 ENCOUNTER — TELEMEDICINE (OUTPATIENT)
Dept: BARIATRICS/WEIGHT MGMT | Age: 45
End: 2025-02-27
Payer: OTHER GOVERNMENT

## 2025-02-27 ENCOUNTER — TELEPHONE (OUTPATIENT)
Dept: BARIATRICS/WEIGHT MGMT | Age: 45
End: 2025-02-27

## 2025-02-27 DIAGNOSIS — Z71.3 DIETARY COUNSELING AND SURVEILLANCE: ICD-10-CM

## 2025-02-27 DIAGNOSIS — E66.01 MORBID OBESITY WITH BMI OF 40.0-44.9, ADULT: Primary | ICD-10-CM

## 2025-02-27 DIAGNOSIS — E55.9 VITAMIN D DEFICIENCY: ICD-10-CM

## 2025-02-27 DIAGNOSIS — R74.8 ELEVATED ALKALINE PHOSPHATASE LEVEL: ICD-10-CM

## 2025-02-27 PROCEDURE — 99214 OFFICE O/P EST MOD 30 MIN: CPT | Performed by: FAMILY MEDICINE

## 2025-02-27 PROCEDURE — G2211 COMPLEX E/M VISIT ADD ON: HCPCS | Performed by: FAMILY MEDICINE

## 2025-02-27 RX ORDER — ERGOCALCIFEROL 1.25 MG/1
50000 CAPSULE, LIQUID FILLED ORAL WEEKLY
Qty: 8 CAPSULE | Refills: 0 | Status: SHIPPED | OUTPATIENT
Start: 2025-02-27

## 2025-02-27 ASSESSMENT — ENCOUNTER SYMPTOMS
CHOKING: 0
ABDOMINAL PAIN: 0
EYE PAIN: 0
WHEEZING: 0
NAUSEA: 0
COUGH: 0
CONSTIPATION: 0
DIARRHEA: 0
APNEA: 0
PHOTOPHOBIA: 0
BLOOD IN STOOL: 0
SHORTNESS OF BREATH: 0
ABDOMINAL DISTENTION: 0
VOMITING: 0
CHEST TIGHTNESS: 0

## 2025-02-27 NOTE — PROGRESS NOTES
personal or family history of thyroid medullary cancer.      Treatment start date: 2/28/25  Starting weight: 234 pounds            Key dietary points:    - Meats (preferably organic or grass fed) are great sources of protein and have no carbohydrates.  - Recommend coconut, olive, avocado, or almond oils.  - When buying dairy, choose regular or full fat options.  - Choose vegetables that grow above ground as they are generally lower in carbohydrates and higher in fiber.  - Avoid starches such as bread, rice, potatoes, pasta and all sources of simple sugars (desserts, soda, breakfast cereals).  - Choose beverages that are calorie and sugar free.    Reminder regarding weight loss medications:    You must be seen in office every 2-4 weeks to haveyour prescriptions refilled.   If you are off of your medication for longer than 7 days, you will not be able to restart the medication for at least 6 months. Always call our office to report any side effects.    Females, it is your responsibility to obtain negative pregnancy tests each month.    No orders of the defined types were placed in this encounter.      No follow-ups on file.    Marge Bell is a 44 y.o. female being evaluated by a Virtual Visit (video visit) encounter to address concerns as mentioned above.  A caregiver was present when appropriate. Due to this being a TeleHealth encounter evaluation of the following organ systems was limited: Vitals/Constitutional/EENT/Resp/CV/GI//MS/Neuro/Skin/Heme-Lymph-Imm.      Marge Bell, was evaluated through a synchronous (real-time) audio-video encounter. The patient (or guardian if applicable) is aware that this is a billable service, which includes applicable co-pays. This Virtual Visit was conducted with patient's (and/or legal guardian's) consent. Patient identification was verified, and a caregiver was present when appropriate.   The patient was located at Home: [address] [place] [address]  Provider

## 2025-02-27 NOTE — TELEPHONE ENCOUNTER
Per Dr. Stock; patient advised to contact the office to schedule 4 week follow-up appointment once Wegovy has been picked up

## 2025-02-28 ENCOUNTER — PATIENT MESSAGE (OUTPATIENT)
Dept: BARIATRICS/WEIGHT MGMT | Age: 45
End: 2025-02-28

## 2025-02-28 NOTE — TELEPHONE ENCOUNTER
Submitted PA for Wegovy 0.25MG/0.5ML auto-injector  [facility]ia [facility] [id number] STATUS: PENDING.    Follow up done daily; if no decision with in three days we will refax.  If another three days goes by with no decision will call the insurance for status.

## 2025-03-05 ENCOUNTER — PATIENT MESSAGE (OUTPATIENT)
Dept: BARIATRICS/WEIGHT MGMT | Age: 45
End: 2025-03-05

## 2025-04-18 ENCOUNTER — TELEMEDICINE (OUTPATIENT)
Dept: BARIATRICS/WEIGHT MGMT | Age: 45
End: 2025-04-18
Payer: OTHER GOVERNMENT

## 2025-04-18 DIAGNOSIS — E66.01 MORBID OBESITY WITH BMI OF 40.0-44.9, ADULT (HCC): Primary | ICD-10-CM

## 2025-04-18 DIAGNOSIS — Z71.3 DIETARY COUNSELING AND SURVEILLANCE: ICD-10-CM

## 2025-04-18 PROCEDURE — G2211 COMPLEX E/M VISIT ADD ON: HCPCS | Performed by: FAMILY MEDICINE

## 2025-04-18 PROCEDURE — 99214 OFFICE O/P EST MOD 30 MIN: CPT | Performed by: FAMILY MEDICINE

## 2025-04-18 NOTE — PROGRESS NOTES
Patient: Marge Bell                      Encounter Date: 4/18/2025    YOB: 1980                Age: 44 y.o.    Chief Complaint   Patient presents with    Weight Management     F/u MWM          Patient identification was verified at the start of the visit.         4/17/2025     7:27 PM   Patient-Reported Vitals   Patient-Reported Weight 230   Patient-Reported Height 5’4”   Patient-Reported Temperature 98.6         BP Readings from Last 1 Encounters:   01/15/25 136/86       BMI Readings from Last 1 Encounters:   01/15/25 41.44 kg/m²       Pulse Readings from Last 1 Encounters:   01/15/25 96          Wt Readings from Last 3 Encounters:   01/15/25 109.5 kg (241 lb 6.4 oz)   11/25/24 108 kg (238 lb)   05/22/24 103.9 kg (229 lb)        HPI: 44 y.o. female with a long-standing history of obesity presents today for virtual video follow-up. She has lost 4 pounds since her last visit. Current treatment includes Wegovy 0.25 mg SC weekly. Mindful of food choices. Tolerating it well. Food recall reviewed with the patient. Making better dietary choices. Motivated to continue losing weight.     Medication(s): Appetite well controlled?     [x]Yes      []No    Focus:     []Good     [x]Fair     []Poor    Side effects? No        Any recent change in medication(s)? No    Exercise: []Cardio     []Resistance/strength training     [x]Other: No intentional exercise, but staying physically active     Allergies   Allergen Reactions    Hdnzidnu-Oistgam-Qnzidk [Fluocinolone] Nausea And Vomiting    Ciprofloxacin Nausea And Vomiting         Current Outpatient Medications:     Semaglutide-Weight Management (WEGOVY) 0.5 MG/0.5ML SOAJ SC injection, Inject 0.5 mg into the skin every 7 days, Disp: 2 mL, Rfl: 0    vitamin D (ERGOCALCIFEROL) 1.25 MG (63448 UT) CAPS capsule, Take 1 capsule by mouth once a week, Disp: 8 capsule, Rfl: 0    fluticasone (FLONASE) 50 MCG/ACT nasal spray, USE 2 SPRAYS IN EACH NOSTRIL DAILY FOR ALLERGIES,

## 2025-05-16 ENCOUNTER — TELEMEDICINE (OUTPATIENT)
Dept: BARIATRICS/WEIGHT MGMT | Age: 45
End: 2025-05-16
Payer: OTHER GOVERNMENT

## 2025-05-16 DIAGNOSIS — Z71.3 DIETARY COUNSELING AND SURVEILLANCE: ICD-10-CM

## 2025-05-16 DIAGNOSIS — E66.01 MORBID OBESITY WITH BMI OF 40.0-44.9, ADULT (HCC): Primary | ICD-10-CM

## 2025-05-16 PROCEDURE — 99214 OFFICE O/P EST MOD 30 MIN: CPT | Performed by: FAMILY MEDICINE

## 2025-05-16 ASSESSMENT — ENCOUNTER SYMPTOMS
COUGH: 0
CHEST TIGHTNESS: 0
VOMITING: 0
ABDOMINAL PAIN: 0
WHEEZING: 0
CHOKING: 0
ABDOMINAL DISTENTION: 0
APNEA: 0
SHORTNESS OF BREATH: 0
BLOOD IN STOOL: 0
CONSTIPATION: 0
DIARRHEA: 0
PHOTOPHOBIA: 0
EYE PAIN: 0
NAUSEA: 0

## 2025-05-16 NOTE — PROGRESS NOTES
Patient: Marge Bell                      Encounter Date: 5/16/2025    YOB: 1980                Age: 45 y.o.    Chief Complaint   Patient presents with    Weight Management     F/u MWM          Patient identification was verified at the start of the visit.         5/16/2025     9:10 AM   Patient-Reported Vitals   Patient-Reported Weight 228   Patient-Reported Height 5’4”   Patient-Reported Temperature 98.6         BP Readings from Last 1 Encounters:   01/15/25 136/86       BMI Readings from Last 1 Encounters:   01/15/25 41.44 kg/m²       Pulse Readings from Last 1 Encounters:   01/15/25 96     Wt Readings from Last 3 Encounters:   01/15/25 109.5 kg (241 lb 6.4 oz)   11/25/24 108 kg (238 lb)   05/22/24 103.9 kg (229 lb)        HPI: 44 y.o. female with a long-standing history of obesity presents today for virtual video follow-up. She has lost 2 pounds since her last visit. Current treatment includes Wegovy 0.5 mg SC weekly. Generally making better dietary choices. Motivated to continue losing weight.      Medication(s): Appetite well controlled?     [x]Yes      []No                          Focus:     []Good     [x]Fair     []Poor                          Side effects? No        Any recent change in medication(s)? No     Exercise: []Cardio     []Resistance/strength training     [x]Other: No intentional exercise, but staying physically active     Allergies   Allergen Reactions    Rfcrcnir-Nxyaduq-Ycerqt [Fluocinolone] Nausea And Vomiting    Ciprofloxacin Nausea And Vomiting         Current Outpatient Medications:     Semaglutide-Weight Management (WEGOVY) 1 MG/0.5ML SOAJ SC injection, Inject 1 mg into the skin every 7 days, Disp: 2 mL, Rfl: 0    vitamin D (ERGOCALCIFEROL) 1.25 MG (69362 UT) CAPS capsule, Take 1 capsule by mouth once a week, Disp: 8 capsule, Rfl: 0    fluticasone (FLONASE) 50 MCG/ACT nasal spray, USE 2 SPRAYS IN EACH NOSTRIL DAILY FOR ALLERGIES, Disp: 48 g, Rfl: 1    fexofenadine

## 2025-05-24 DIAGNOSIS — J30.89 ACUTE NON-SEASONAL ALLERGIC RHINITIS: ICD-10-CM

## 2025-05-27 ENCOUNTER — OFFICE VISIT (OUTPATIENT)
Dept: ENT CLINIC | Age: 45
End: 2025-05-27
Payer: OTHER GOVERNMENT

## 2025-05-27 VITALS
WEIGHT: 233 LBS | DIASTOLIC BLOOD PRESSURE: 85 MMHG | HEART RATE: 88 BPM | OXYGEN SATURATION: 96 % | HEIGHT: 64 IN | SYSTOLIC BLOOD PRESSURE: 132 MMHG | TEMPERATURE: 97.8 F | BODY MASS INDEX: 39.78 KG/M2

## 2025-05-27 DIAGNOSIS — Z96.22 PATENT PRESSURE EQUALIZATION (PE) TUBE: ICD-10-CM

## 2025-05-27 DIAGNOSIS — J30.89 ACUTE NON-SEASONAL ALLERGIC RHINITIS: Primary | ICD-10-CM

## 2025-05-27 PROCEDURE — 99212 OFFICE O/P EST SF 10 MIN: CPT | Performed by: STUDENT IN AN ORGANIZED HEALTH CARE EDUCATION/TRAINING PROGRAM

## 2025-05-27 RX ORDER — FEXOFENADINE HCL 180 MG/1
TABLET ORAL
Qty: 90 TABLET | Refills: 3 | OUTPATIENT
Start: 2025-05-27

## 2025-05-27 NOTE — PROGRESS NOTES
[facility]  DIVISION OF OTOLARYNGOLOGY- HEAD & NECK SURGERY  CLINIC FOLLOW-UP VISIT      Patient Name: Marge Bell  Medical Record Number:  [id number]  Primary Care Physician:  Mark Messina MD    ChiefComplaint     Chief Complaint   Patient presents with    Follow-up     F/u check ears       History of Present Illness     Marge Bell is an 45 y.o. female previously seen for nonseasonal allergic rhinitis, ETD with history of tympanostomy tube placement on 6/20/2022.  Last seen by Dr. Tucker on 12/15/2024.    Interval History:   She is here for routine checkup.  No new issues.  Allergies controlled with Allegra and Flonase.  Denies otalgia otorrhea.  Denies hearing changes.    Past Medical History     Past Medical History:   Diagnosis Date    Allergic rhinitis 03/16/2022    OLEKSANDR (generalized anxiety disorder) 03/16/2022    CASSANDRA treated with BiPAP        Past Surgical History     Past Surgical History:   Procedure Laterality Date    MYRINGOTOMY Left 9/18/2020    LEFT MYRINGOTOMY AND INSERTION OF PRESSURE EQUALIZING TUBE, BALLOON DILATION OF BILATERAL EUSTACHIAN TUBES performed by Roe Tucker MD at [facility] OR    MYRINGOTOMY Bilateral 6/28/2022    BILATERAL MYRINGOTOMY AND TYMPANOSTOMY WITH BUTTERFLY TUBES performed by Roe Tucker MD at [facility] OR    MYRINGOTOMY AND TYMPANOSTOMY TUBE PLACEMENT N/A 8/15/2019    LEFT MYRINGOTOMY AND INSERTION OF LEFT PRESSURE EQUALIZING TUBE, BIOPSY OF NASOPHARYNX performed by Roe Tucker MD at [facility] OR    SEPTOPLASTY Bilateral 11/7/2023    SEPTAL RECONSTRUCTION; INFERIOR TURBINATE REDUCTION performed by Roe Tucker MD at [facility] OR    SINUS SURGERY  2002    Dr. Gu    TYMPANOSTOMY TUBE PLACEMENT Bilateral 2002    Dr. Gu       Family History     Family History   Problem Relation Age of Onset    Breast Cancer Mother     High Blood Pressure Maternal Grandmother     Breast Cancer Paternal Grandmother        Social History     Social History     Tobacco

## 2025-06-11 VITALS — BODY MASS INDEX: 39.34 KG/M2 | WEIGHT: 229.2 LBS

## 2025-06-12 ENCOUNTER — TELEMEDICINE (OUTPATIENT)
Dept: BARIATRICS/WEIGHT MGMT | Age: 45
End: 2025-06-12
Payer: OTHER GOVERNMENT

## 2025-06-12 DIAGNOSIS — E66.01 MORBID OBESITY WITH BMI OF 40.0-44.9, ADULT (HCC): Primary | ICD-10-CM

## 2025-06-12 DIAGNOSIS — Z71.3 DIETARY COUNSELING AND SURVEILLANCE: ICD-10-CM

## 2025-06-12 PROCEDURE — 99214 OFFICE O/P EST MOD 30 MIN: CPT | Performed by: FAMILY MEDICINE

## 2025-06-12 ASSESSMENT — ENCOUNTER SYMPTOMS
PHOTOPHOBIA: 0
APNEA: 0
NAUSEA: 0
ABDOMINAL DISTENTION: 0
VOMITING: 0
DIARRHEA: 0
SHORTNESS OF BREATH: 0
COUGH: 0
ABDOMINAL PAIN: 0
CHEST TIGHTNESS: 0
WHEEZING: 0
CONSTIPATION: 0
BLOOD IN STOOL: 0
CHOKING: 0
EYE PAIN: 0

## 2025-06-12 NOTE — PROGRESS NOTES
than 7 days, you will not be able to restart the medication for at least 6 months. Always call our office to report any side effects.    Females, it is your responsibility to obtain negative pregnancy tests each month.    No orders of the defined types were placed in this encounter.      No follow-ups on file.    Marge Bell is a 45 y.o. female being evaluated by a Virtual Visit (video visit) encounter to address concerns as mentioned above.  A caregiver was present when appropriate. Due to this being a TeleHealth encounter evaluation of the following organ systems was limited: Vitals/Constitutional/EENT/Resp/CV/GI//MS/Neuro/Skin/Heme-Lymph-Imm.      Marge Bell, was evaluated through a synchronous (real-time) audio-video encounter. The patient (or guardian if applicable) is aware that this is a billable service, which includes applicable co-pays. This Virtual Visit was conducted with patient's (and/or legal guardian's) consent. Patient identification was verified, and a caregiver was present when appropriate.   The patient was located at Other: [place]  Provider was located at Home (Appt Dept State): [place]  Confirm you are appropriately licensed, registered, or certified to deliver care in the state where the patient is located as indicated above. If you are not or unsure, please re-schedule the visit: Yes, I confirm.        Total time spent for this encounter: Not billed by time    --Rob Stock MD on 6/12/2025 at 10:50 AM    An electronic signature was used to authenticate this note.

## 2025-06-25 ENCOUNTER — HOSPITAL ENCOUNTER (OUTPATIENT)
Dept: MAMMOGRAPHY | Age: 45
Discharge: HOME | End: 2025-06-25
Payer: OTHER GOVERNMENT

## 2025-06-25 DIAGNOSIS — Z12.31 ENCOUNTER FOR SCREENING MAMMOGRAM FOR BREAST CANCER: ICD-10-CM

## 2025-06-25 PROCEDURE — 77063 BREAST TOMOSYNTHESIS BI: CPT

## 2025-06-26 ENCOUNTER — TELEPHONE (OUTPATIENT)
Dept: ENT CLINIC | Age: 45
End: 2025-06-26

## 2025-06-26 DIAGNOSIS — J30.89 ACUTE NON-SEASONAL ALLERGIC RHINITIS: ICD-10-CM

## 2025-06-26 RX ORDER — FLUTICASONE PROPIONATE 50 MCG
SPRAY, SUSPENSION (ML) NASAL
Qty: 48 G | Refills: 5 | Status: SHIPPED | OUTPATIENT
Start: 2025-06-26

## 2025-06-26 NOTE — TELEPHONE ENCOUNTER
Refill Request:     Last Office Visit:  5/27/2025     Next Scheduled Visit : Visit date not found     Medication Requested:     fluticasone (FLONASE) 50 MCG/ACT nasal spray      Pharmacy:    [facility] - [address] [phone] - F [phone]

## 2025-07-10 ENCOUNTER — TELEMEDICINE (OUTPATIENT)
Dept: BARIATRICS/WEIGHT MGMT | Age: 45
End: 2025-07-10
Payer: OTHER GOVERNMENT

## 2025-07-10 DIAGNOSIS — E66.812 CLASS 2 OBESITY: Primary | ICD-10-CM

## 2025-07-10 DIAGNOSIS — Z71.3 DIETARY COUNSELING AND SURVEILLANCE: ICD-10-CM

## 2025-07-10 PROCEDURE — 99214 OFFICE O/P EST MOD 30 MIN: CPT | Performed by: FAMILY MEDICINE

## 2025-07-10 ASSESSMENT — ENCOUNTER SYMPTOMS
DIARRHEA: 0
EYE PAIN: 0
BLOOD IN STOOL: 0
APNEA: 0
PHOTOPHOBIA: 0
VOMITING: 0
CHOKING: 0
ABDOMINAL DISTENTION: 0
ABDOMINAL PAIN: 0
CONSTIPATION: 0
SHORTNESS OF BREATH: 0
NAUSEA: 0
WHEEZING: 0
COUGH: 0
CHEST TIGHTNESS: 0

## 2025-07-10 NOTE — PROGRESS NOTES
Patient: Marge Bell                      Encounter Date: 7/10/2025    YOB: 1980                Age: 45 y.o.    Chief Complaint   Patient presents with    Weight Management     F/u MWM          Patient identification was verified at the start of the visit.         7/9/2025     1:41 PM   Patient-Reported Vitals   Patient-Reported Weight 217.5   Patient-Reported Height 5’4”   Patient-Reported Temperature 98.6         BP Readings from Last 1 Encounters:   05/27/25 132/85       BMI Readings from Last 1 Encounters:   06/11/25 39.34 kg/m²       Pulse Readings from Last 1 Encounters:   05/27/25 88     Wt Readings from Last 3 Encounters:   06/11/25 104 kg (229 lb 3.2 oz)   05/27/25 105.7 kg (233 lb)   01/15/25 109.5 kg (241 lb 6.4 oz)         HPI: 44 y.o. female with a long-standing history of obesity presents today for virtual video follow-up. She has lost 7 pounds since her last visit. Current treatment includes Wegovy 1.7 mg SC weekly.  Making better dietary choices.  Mindful of portion sizes.  Physical activity continues to be limited.     Medication(s): Appetite well controlled?     [x]Yes      []No                          Focus:     [x]Good     []Fair     []Poor                          Side effects? No        Any recent change in medication(s)? No     Exercise: []Cardio     []Resistance/strength training     [x]Other: No intentional exercise, but staying physically active        Allergies   Allergen Reactions    Aesmkptl-Knxqylv-Dhjpmf [Fluocinolone] Nausea And Vomiting    Ciprofloxacin Nausea And Vomiting         Current Outpatient Medications:     Semaglutide-Weight Management (WEGOVY) 2.4 MG/0.75ML SOAJ SC injection, Inject 2.4 mg into the skin every 7 days, Disp: 3 mL, Rfl: 0    fluticasone (FLONASE) 50 MCG/ACT nasal spray, USE 2 SPRAYS IN EACH NOSTRIL DAILY FOR ALLERGIES, Disp: 48 g, Rfl: 5    vitamin D (ERGOCALCIFEROL) 1.25 MG (65039 UT) CAPS capsule, Take 1 capsule by mouth once a week,

## 2025-08-04 ENCOUNTER — HOSPITAL ENCOUNTER (OUTPATIENT)
Dept: OTHER | Age: 45
Discharge: HOME | End: 2025-08-04
Payer: OTHER GOVERNMENT

## 2025-08-04 DIAGNOSIS — E66.812 CLASS 2 OBESITY: ICD-10-CM

## 2025-08-04 LAB
25(OH)D3 SERPL-MCNC: 36.6 NG/ML
ALBUMIN SERPL-MCNC: 3.8 G/DL (ref 3.4–5)
ALBUMIN/GLOB SERPL: 1.1 {RATIO} (ref 1.1–2.2)
ALP SERPL-CCNC: 98 U/L (ref 40–129)
ALT SERPL-CCNC: 13 U/L (ref 10–40)
ANION GAP SERPL CALCULATED.3IONS-SCNC: 13 MMOL/L (ref 3–16)
AST SERPL-CCNC: 15 U/L (ref 15–37)
BILIRUB SERPL-MCNC: 0.3 MG/DL (ref 0–1)
BUN SERPL-MCNC: 12 MG/DL (ref 7–20)
CALCIUM SERPL-MCNC: 9.1 MG/DL (ref 8.3–10.6)
CHLORIDE SERPL-SCNC: 105 MMOL/L (ref 99–110)
CHOLEST SERPL-MCNC: 174 MG/DL (ref 0–199)
CO2 SERPL-SCNC: 23 MMOL/L (ref 21–32)
CREAT SERPL-MCNC: 0.8 MG/DL (ref 0.6–1.1)
DEPRECATED RDW RBC AUTO: 14.1 % (ref 12.4–15.4)
EST. AVERAGE GLUCOSE BLD GHB EST-MCNC: 99.7 MG/DL
FOLATE SERPL-MCNC: 9.87 NG/ML (ref 4.78–24.2)
GFR SERPLBLD CREATININE-BSD FMLA CKD-EPI: >90 ML/MIN/{1.73_M2}
GLUCOSE SERPL-MCNC: 81 MG/DL (ref 70–99)
HBA1C MFR BLD: 5.1 %
HCT VFR BLD AUTO: 39.6 % (ref 36–48)
HDLC SERPL-MCNC: 46 MG/DL (ref 40–60)
HGB BLD-MCNC: 13.5 G/DL (ref 12–16)
LDLC SERPL CALC-MCNC: 96 MG/DL
MCH RBC QN AUTO: 28.5 PG (ref 26–34)
MCHC RBC AUTO-ENTMCNC: 33.9 G/DL (ref 31–36)
MCV RBC AUTO: 84 FL (ref 80–100)
PLATELET # BLD AUTO: 284 K/UL (ref 135–450)
PMV BLD AUTO: 9.1 FL (ref 5–10.5)
POTASSIUM SERPL-SCNC: 4 MMOL/L (ref 3.5–5.1)
PROT SERPL-MCNC: 7.4 G/DL (ref 6.4–8.2)
RBC # BLD AUTO: 4.72 M/UL (ref 4–5.2)
SODIUM SERPL-SCNC: 141 MMOL/L (ref 136–145)
TRIGL SERPL-MCNC: 160 MG/DL (ref 0–150)
TSH SERPL DL<=0.005 MIU/L-ACNC: 1.28 UIU/ML (ref 0.27–4.2)
VIT B12 SERPL-MCNC: 621 PG/ML (ref 211–911)
VLDLC SERPL CALC-MCNC: 32 MG/DL
WBC # BLD AUTO: 7.2 K/UL (ref 4–11)

## 2025-08-04 PROCEDURE — 84443 ASSAY THYROID STIM HORMONE: CPT

## 2025-08-04 PROCEDURE — 82607 VITAMIN B-12: CPT

## 2025-08-04 PROCEDURE — 85027 COMPLETE CBC AUTOMATED: CPT

## 2025-08-04 PROCEDURE — 83036 HEMOGLOBIN GLYCOSYLATED A1C: CPT

## 2025-08-04 PROCEDURE — 82306 VITAMIN D 25 HYDROXY: CPT

## 2025-08-04 PROCEDURE — 80061 LIPID PANEL: CPT

## 2025-08-04 PROCEDURE — 82746 ASSAY OF FOLIC ACID SERUM: CPT

## 2025-08-04 PROCEDURE — 36415 COLL VENOUS BLD VENIPUNCTURE: CPT

## 2025-08-04 PROCEDURE — 80053 COMPREHEN METABOLIC PANEL: CPT

## 2025-08-05 ENCOUNTER — TELEMEDICINE (OUTPATIENT)
Dept: BARIATRICS/WEIGHT MGMT | Age: 45
End: 2025-08-05
Payer: OTHER GOVERNMENT

## 2025-08-05 DIAGNOSIS — E66.812 CLASS 2 OBESITY: Primary | ICD-10-CM

## 2025-08-05 DIAGNOSIS — Z71.3 DIETARY COUNSELING AND SURVEILLANCE: ICD-10-CM

## 2025-08-05 PROCEDURE — 99214 OFFICE O/P EST MOD 30 MIN: CPT | Performed by: FAMILY MEDICINE

## 2025-08-05 ASSESSMENT — ENCOUNTER SYMPTOMS
CHOKING: 0
NAUSEA: 0
EYE PAIN: 0
CHEST TIGHTNESS: 0
CONSTIPATION: 0
ABDOMINAL PAIN: 0
ABDOMINAL DISTENTION: 0
WHEEZING: 0
VOMITING: 0
BLOOD IN STOOL: 0
APNEA: 0
PHOTOPHOBIA: 0
COUGH: 0
SHORTNESS OF BREATH: 0
DIARRHEA: 0

## 2025-09-05 ENCOUNTER — TELEMEDICINE (OUTPATIENT)
Dept: BARIATRICS/WEIGHT MGMT | Age: 45
End: 2025-09-05
Payer: OTHER GOVERNMENT

## 2025-09-05 VITALS — BODY MASS INDEX: 37.08 KG/M2 | HEIGHT: 64 IN | WEIGHT: 217.2 LBS

## 2025-09-05 DIAGNOSIS — E66.812 CLASS 2 OBESITY: Primary | ICD-10-CM

## 2025-09-05 DIAGNOSIS — Z71.3 DIETARY COUNSELING AND SURVEILLANCE: ICD-10-CM

## 2025-09-05 PROCEDURE — 99214 OFFICE O/P EST MOD 30 MIN: CPT | Performed by: FAMILY MEDICINE

## 2025-09-05 ASSESSMENT — ENCOUNTER SYMPTOMS
CHEST TIGHTNESS: 0
WHEEZING: 0
ABDOMINAL DISTENTION: 0
SHORTNESS OF BREATH: 0
APNEA: 0
ABDOMINAL PAIN: 0
CONSTIPATION: 0
DIARRHEA: 0
EYE PAIN: 0
BLOOD IN STOOL: 0
NAUSEA: 0
PHOTOPHOBIA: 0
COUGH: 0
CHOKING: 0

## (undated) DEVICE — SUTURE PLN GUT SZ 4-0 L18IN ABSRB YELLOWISH TAN L13MM SC-1 1828H

## (undated) DEVICE — BLADE 1882940HR 5PK M4 INF TURB 2.9MM: Brand: STRAIGHTSHOT

## (undated) DEVICE — GLOVE,SURG,SENSICARE SLT,LF,PF,8: Brand: MEDLINE

## (undated) DEVICE — SPONGE,NEURO,0.5"X3",XR,STRL,LF,10/PK: Brand: MEDLINE

## (undated) DEVICE — KIT OR ROOM TURNOVER W/STRAP

## (undated) DEVICE — TOWEL,OR,DSP,ST,BLUE,STD,4/PK,20PK/CS: Brand: MEDLINE

## (undated) DEVICE — SYSTEM AIRWAY NASAL CLEAR FLOW RELTOK

## (undated) DEVICE — SOLUTION IV IRRIG 500ML 0.9% SODIUM CHL 2F7123

## (undated) DEVICE — STERILE COTTON BALLS LARGE 5/P: Brand: MEDLINE

## (undated) DEVICE — TOWEL,OR,DSP,ST,BLUE,STD,6/PK,12PK/CS: Brand: MEDLINE

## (undated) DEVICE — NEEDLE FLTR 19GA L1.5IN WALL THK5UM BRN POLYPR HUB S STL

## (undated) DEVICE — MEDICINE CUP, GRADUATED, STER: Brand: MEDLINE

## (undated) DEVICE — STERILE LATEX POWDER-FREE SURGICAL GLOVESWITH NITRILE COATING: Brand: PROTEXIS

## (undated) DEVICE — PACK PROCEDURE SURG EXTREMITY MFFOP CUST

## (undated) DEVICE — DRAPE EENT SPLIT

## (undated) DEVICE — SURGICAL SUCTION CONNECTING TUBE WITH MALE CONNECTOR AND SUCTION CLAMP, 2 FT. LONG (.6 M), 5 MM I.D.: Brand: CONMED

## (undated) DEVICE — YANKAUER,BULB TIP,W/O VENT,RIGID,STERILE: Brand: MEDLINE

## (undated) DEVICE — GAUZE,SPONGE,4"X4",8PLY,STRL,LF,10/TRAY: Brand: MEDLINE

## (undated) DEVICE — MEDI-VAC NON-CONDUCTIVE SUCTION TUBING: Brand: CARDINAL HEALTH

## (undated) DEVICE — DRAPE,REIN 53X77,STERILE: Brand: MEDLINE

## (undated) DEVICE — SYSTEM BLLN DIL L16MM DIA6MM FOR EUSTACHIAN TB

## (undated) DEVICE — SYRINGE MED 10ML TRNSLUC BRL PLUNG BLK MRK POLYPR CTRL

## (undated) DEVICE — MASTISOL ADHESIVE LIQ 2/3ML

## (undated) DEVICE — CATH URETH 12FR RUBBER SOLID TIP

## (undated) DEVICE — 3 ML SYRINGE LUER-LOCK TIP: Brand: MONOJECT

## (undated) DEVICE — TUBING, SUCTION, 1/4" X 10', STRAIGHT: Brand: MEDLINE

## (undated) DEVICE — KNIFE SURG EAR S STL SHFT SCKL BLDE W/ POLYPR FLAT HNDL 6/PK

## (undated) DEVICE — COTTON BALLS: Brand: DEROYAL

## (undated) DEVICE — NEEDLE SPNL 25GA L2IN BLU SHT NEO LUM PUNC DISP

## (undated) DEVICE — YANKAUER SUCTION INSTRUMENT NO CONTROL VENT, BULB TIP, CLEAR: Brand: YANKAUER

## (undated) DEVICE — DEVICE INFL PRSS G INDIC DISP (MUST BE PURC IN MULTIPLES OF 5)

## (undated) DEVICE — PACKING 440413 10PK DOYLE SLIMLINE: Brand: MEROCEL®

## (undated) DEVICE — BLADE MYR OFFSET 45DEG SPEAR TIP NAR SHFT W/ RND KNURLED

## (undated) DEVICE — SUTURE CHROMIC GUT SZ 4-0 L18IN ABSRB BRN L13MM P-3 3/8 CIR 1654G

## (undated) DEVICE — HYPODERMIC SAFETY NEEDLE: Brand: MAGELLAN

## (undated) DEVICE — GLOVE ORANGE PI 8   MSG9080

## (undated) DEVICE — WIPE INSTR W73XL73CM VISITEC

## (undated) DEVICE — SOLUTION IRRIG 500ML 0.9% SOD CHLO USP POUR PLAS BTL